# Patient Record
Sex: FEMALE | Race: WHITE | Employment: FULL TIME | URBAN - NONMETROPOLITAN AREA
[De-identification: names, ages, dates, MRNs, and addresses within clinical notes are randomized per-mention and may not be internally consistent; named-entity substitution may affect disease eponyms.]

---

## 2017-01-31 ENCOUNTER — OFFICE VISIT (OUTPATIENT)
Dept: FAMILY MEDICINE CLINIC | Age: 62
End: 2017-01-31

## 2017-01-31 VITALS
DIASTOLIC BLOOD PRESSURE: 68 MMHG | BODY MASS INDEX: 37.77 KG/M2 | HEART RATE: 68 BPM | WEIGHT: 235 LBS | HEIGHT: 66 IN | OXYGEN SATURATION: 95 % | SYSTOLIC BLOOD PRESSURE: 116 MMHG

## 2017-01-31 DIAGNOSIS — I26.09 OTHER ACUTE PULMONARY EMBOLISM WITH ACUTE COR PULMONALE (HCC): Primary | ICD-10-CM

## 2017-01-31 DIAGNOSIS — I82.4Z1 ACUTE EMBOLISM AND THROMBOSIS OF DEEP VEIN OF RIGHT DISTAL LOWER EXTREMITY (HCC): ICD-10-CM

## 2017-01-31 DIAGNOSIS — R06.09 DYSPNEA ON EXERTION: ICD-10-CM

## 2017-01-31 DIAGNOSIS — I82.4Y1 ACUTE DEEP VEIN THROMBOSIS (DVT) OF PROXIMAL VEIN OF RIGHT LOWER EXTREMITY (HCC): ICD-10-CM

## 2017-01-31 PROCEDURE — 99214 OFFICE O/P EST MOD 30 MIN: CPT | Performed by: FAMILY MEDICINE

## 2017-01-31 ASSESSMENT — ENCOUNTER SYMPTOMS
ABDOMINAL PAIN: 0
COUGH: 0
BLURRED VISION: 0

## 2017-01-31 ASSESSMENT — PATIENT HEALTH QUESTIONNAIRE - PHQ9
2. FEELING DOWN, DEPRESSED OR HOPELESS: 0
1. LITTLE INTEREST OR PLEASURE IN DOING THINGS: 0
SUM OF ALL RESPONSES TO PHQ9 QUESTIONS 1 & 2: 0
SUM OF ALL RESPONSES TO PHQ QUESTIONS 1-9: 0

## 2017-04-05 ENCOUNTER — TELEPHONE (OUTPATIENT)
Dept: FAMILY MEDICINE CLINIC | Age: 62
End: 2017-04-05

## 2017-05-10 ENCOUNTER — TELEPHONE (OUTPATIENT)
Dept: FAMILY MEDICINE CLINIC | Age: 62
End: 2017-05-10

## 2017-05-11 ENCOUNTER — OFFICE VISIT (OUTPATIENT)
Dept: FAMILY MEDICINE CLINIC | Age: 62
End: 2017-05-11

## 2017-05-11 VITALS
DIASTOLIC BLOOD PRESSURE: 72 MMHG | OXYGEN SATURATION: 96 % | WEIGHT: 237.4 LBS | BODY MASS INDEX: 38.15 KG/M2 | HEART RATE: 82 BPM | SYSTOLIC BLOOD PRESSURE: 118 MMHG | HEIGHT: 66 IN

## 2017-05-11 DIAGNOSIS — Z86.711 HISTORY OF PULMONARY EMBOLISM: Primary | ICD-10-CM

## 2017-05-11 PROCEDURE — 99213 OFFICE O/P EST LOW 20 MIN: CPT | Performed by: FAMILY MEDICINE

## 2017-05-11 RX ORDER — DABIGATRAN ETEXILATE 150 MG/1
150 CAPSULE, COATED PELLETS ORAL 2 TIMES DAILY
Qty: 60 CAPSULE | Refills: 1 | Status: SHIPPED | OUTPATIENT
Start: 2017-05-11 | End: 2017-05-18 | Stop reason: SDUPTHER

## 2017-05-11 ASSESSMENT — ENCOUNTER SYMPTOMS
ABDOMINAL PAIN: 0
BLURRED VISION: 0
COUGH: 0
SHORTNESS OF BREATH: 1

## 2017-05-18 ENCOUNTER — TELEPHONE (OUTPATIENT)
Dept: FAMILY MEDICINE CLINIC | Age: 62
End: 2017-05-18

## 2017-05-18 DIAGNOSIS — Z86.711 HISTORY OF PULMONARY EMBOLISM: ICD-10-CM

## 2017-05-18 RX ORDER — DABIGATRAN ETEXILATE 150 MG/1
150 CAPSULE, COATED PELLETS ORAL 2 TIMES DAILY
Qty: 60 CAPSULE | Refills: 1 | Status: SHIPPED | OUTPATIENT
Start: 2017-05-18 | End: 2019-06-14

## 2017-07-11 ENCOUNTER — TELEPHONE (OUTPATIENT)
Dept: FAMILY MEDICINE CLINIC | Age: 62
End: 2017-07-11

## 2017-08-01 ENCOUNTER — TELEPHONE (OUTPATIENT)
Dept: FAMILY MEDICINE CLINIC | Age: 62
End: 2017-08-01

## 2019-06-14 ENCOUNTER — OFFICE VISIT (OUTPATIENT)
Dept: FAMILY MEDICINE CLINIC | Age: 64
End: 2019-06-14
Payer: COMMERCIAL

## 2019-06-14 VITALS
HEIGHT: 66 IN | DIASTOLIC BLOOD PRESSURE: 74 MMHG | WEIGHT: 229.6 LBS | HEART RATE: 64 BPM | BODY MASS INDEX: 36.9 KG/M2 | OXYGEN SATURATION: 98 % | RESPIRATION RATE: 12 BRPM | SYSTOLIC BLOOD PRESSURE: 120 MMHG

## 2019-06-14 DIAGNOSIS — R59.0 AXILLARY LYMPHADENOPATHY: ICD-10-CM

## 2019-06-14 DIAGNOSIS — N63.13 MASS OF LOWER OUTER QUADRANT OF RIGHT BREAST: ICD-10-CM

## 2019-06-14 DIAGNOSIS — N63.21 MASS OF UPPER OUTER QUADRANT OF LEFT BREAST: Primary | ICD-10-CM

## 2019-06-14 PROCEDURE — 99213 OFFICE O/P EST LOW 20 MIN: CPT | Performed by: FAMILY MEDICINE

## 2019-06-14 ASSESSMENT — PATIENT HEALTH QUESTIONNAIRE - PHQ9
1. LITTLE INTEREST OR PLEASURE IN DOING THINGS: 0
SUM OF ALL RESPONSES TO PHQ QUESTIONS 1-9: 0
2. FEELING DOWN, DEPRESSED OR HOPELESS: 0
SUM OF ALL RESPONSES TO PHQ9 QUESTIONS 1 & 2: 0
SUM OF ALL RESPONSES TO PHQ QUESTIONS 1-9: 0

## 2019-06-14 NOTE — LETTER
[address]  [address] [address] Dustin Solis  Phone: [phone]  Fax: [phone]    Jorge Obrien MD        June 14, 2019     Patient: Char Coto   YOB: 1955   Date of Visit: 6/14/2019       To Whom It May Concern: It is my medical opinion that Amos Tanner should be excused for work absence for today through Monday 6/17/2019. If you have any questions or concerns, please don't hesitate to call.     Sincerely,        Jorge Obrien MD

## 2019-06-14 NOTE — PROGRESS NOTES
Chief Complaint   Patient presents with    Mass     right breast noticed a month ago. Hoh NARRATIVE:    AS ABOVE. It is somewhat firm and mobile and larger than 1 centimeter. No strong FHx breast cancer. No nipple discharge or skin changes. No previous mammograms. Patient is established unless otherwise noted. Above chief complaint and Hoh obtained by this physician provider. Review of Systems   Constitutional: Negative for activity change, chills, fatigue and fever. HENT: Negative for congestion. Respiratory: Negative for cough, chest tightness, shortness of breath and wheezing. Cardiovascular: Negative for chest pain and leg swelling. Gastrointestinal: Negative for abdominal pain. Musculoskeletal: Negative for back pain and myalgias. Skin: Negative for rash. Psychiatric/Behavioral: Negative for behavioral problems and dysphoric mood. No Known Allergies  Allergy historyupdated. No outpatient medications have been marked as taking for the 6/14/19 encounter (Office Visit) with Odalis Liu MD.       Tobacco use history updated. Social History     Tobacco Use   Smoking Status Former Smoker    Packs/day: 0.50    Last attempt to quit: 6/14/2016    Years since quitting: 3.0   Smokeless Tobacco Never Used        Nursing note reviewed. Vitals:    06/14/19 0802   BP: 120/74   Site: Left Upper Arm   Position: Sitting   Cuff Size: Large Adult   Pulse: 64   Resp: 12   SpO2: 98%   Weight: 229 lb 9.6 oz (104.1 kg)   Height: 5' 6\" (1.676 m)          BP Readings from Last 3 Encounters:   06/14/19 120/74   05/11/17 118/72   01/31/17 116/68     Wt Readings from Last 3 Encounters:   06/14/19 229 lb 9.6 oz (104.1 kg)   05/11/17 237 lb 6.4 oz (107.7 kg)   01/31/17 235 lb (106.6 kg)     Body mass index is 37.06 kg/m². No results found for this visit on 06/14/19. Physical Exam   Constitutional: She is oriented to person, place, and time.  She appears well-developed and well-nourished. No distress. HENT:   Head: Normocephalic. Mouth/Throat: Oropharynx is clear and moist.   Eyes: Pupils are equal, round, and reactive to light. Conjunctivae are normal. Right eye exhibits no discharge. Left eye exhibits no discharge. Neck: Normal range of motion. Cardiovascular: Normal rate, regular rhythm and normal heart sounds. No murmur heard. Pulmonary/Chest: Effort normal and breath sounds normal. No respiratory distress. She has no wheezes. She has no rales. Bilateral breast exam reveals 1.5 cm breast mass in LO quadrant of right breast, and a smaller mass in upper outer quadrant of left breast.  She also has mild NUNU in right axilla. Left breast was normal.     Neurological: She is alert and oriented to person, place, and time. Skin: Skin is warm and dry. She is not diaphoretic. Psychiatric: She has a normal mood and affect. Her behavior is normal.   Nursing note and vitals reviewed. _________________________________________________  Assessment:     [facility] was seen today for mass. Diagnoses and all orders for this visit:    Mass of upper outer quadrant of left breast  -     [facility] DIGITAL DIAGNOSTIC AUGMENTED BILATERAL; Future  -     Cancel: WILMER US BREAST LEFT COMPLETE; Future    Mass of lower outer quadrant of right breast  -     [facility] DIGITAL DIAGNOSTIC AUGMENTED BILATERAL; Future  -     Cancel: WILMER US BREAST RIGHT COMPLETE; Future    Axillary lymphadenopathy  -     [facility] DIGITAL DIAGNOSTIC AUGMENTED BILATERAL; Future  -     Cancel: WILMER US BREAST RIGHT COMPLETE; Future  -     Cancel: WILMER US BREAST LEFT COMPLETE; Future      Problems listed above are all new and alarming:new breast findings as above need mammo and possibly additional imaging. Therapeutic plan is unchanged unless otherwise specified. See orders above and comments below for details of workup or medication orders.           _________________________________________________  Plan:     Referred for mammo. Will likely need to go on to biopsy unless imagine shows benign cyst.       Return if symptoms worsen or fail to improve, for n.       Electronically signed by Efra Domínguez MD on 6/14/19 at 8:11 AM

## 2019-06-17 ENCOUNTER — HOSPITAL ENCOUNTER (OUTPATIENT)
Dept: WOMENS IMAGING | Age: 64
Discharge: HOME | End: 2019-06-17
Payer: COMMERCIAL

## 2019-06-17 ENCOUNTER — HOSPITAL ENCOUNTER (OUTPATIENT)
Dept: ULTRASOUND IMAGING | Age: 64
Discharge: HOME | End: 2019-06-17
Payer: COMMERCIAL

## 2019-06-17 DIAGNOSIS — N63.21 MASS OF UPPER OUTER QUADRANT OF LEFT BREAST: ICD-10-CM

## 2019-06-17 DIAGNOSIS — N63.13 MASS OF LOWER OUTER QUADRANT OF RIGHT BREAST: ICD-10-CM

## 2019-06-17 DIAGNOSIS — R59.0 AXILLARY LYMPHADENOPATHY: ICD-10-CM

## 2019-06-17 PROCEDURE — 76642 ULTRASOUND BREAST LIMITED: CPT

## 2019-06-17 PROCEDURE — G0279 TOMOSYNTHESIS, MAMMO: HCPCS

## 2019-06-24 ENCOUNTER — HOSPITAL ENCOUNTER (OUTPATIENT)
Dept: ULTRASOUND IMAGING | Age: 64
Discharge: HOME | End: 2019-06-24
Payer: COMMERCIAL

## 2019-06-24 ENCOUNTER — HOSPITAL ENCOUNTER (OUTPATIENT)
Dept: WOMENS IMAGING | Age: 64
Discharge: HOME | End: 2019-06-24
Payer: COMMERCIAL

## 2019-06-24 DIAGNOSIS — N63.10 BREAST MASS, RIGHT: ICD-10-CM

## 2019-06-24 DIAGNOSIS — N63.20 LEFT BREAST MASS: ICD-10-CM

## 2019-06-24 PROCEDURE — 77066 DX MAMMO INCL CAD BI: CPT

## 2019-06-24 PROCEDURE — 88341 IMHCHEM/IMCYTCHM EA ADD ANTB: CPT

## 2019-06-24 PROCEDURE — 88305 TISSUE EXAM BY PATHOLOGIST: CPT

## 2019-06-24 PROCEDURE — 88342 IMHCHEM/IMCYTCHM 1ST ANTB: CPT

## 2019-06-24 PROCEDURE — 88360 TUMOR IMMUNOHISTOCHEM/MANUAL: CPT

## 2019-06-24 PROCEDURE — 2709999900 US BREAST BIOPSY W LOC DEVICE 1ST LESION RIGHT

## 2019-06-24 PROCEDURE — 2720000010 US BREAST BIOPSY W LOC DEVICE 1ST LESION LEFT

## 2019-06-30 ASSESSMENT — ENCOUNTER SYMPTOMS
ABDOMINAL PAIN: 0
SHORTNESS OF BREATH: 0
BACK PAIN: 0
WHEEZING: 0
COUGH: 0
CHEST TIGHTNESS: 0

## 2019-07-01 ENCOUNTER — TELEPHONE (OUTPATIENT)
Dept: FAMILY MEDICINE CLINIC | Age: 64
End: 2019-07-01

## 2019-07-01 DIAGNOSIS — Z17.0 CARCINOMA OF RIGHT BREAST IN FEMALE, ESTROGEN RECEPTOR POSITIVE, UNSPECIFIED SITE OF BREAST (HCC): Primary | ICD-10-CM

## 2019-07-01 DIAGNOSIS — D05.12 DUCTAL CARCINOMA IN SITU (DCIS) OF LEFT BREAST: ICD-10-CM

## 2019-07-01 DIAGNOSIS — C50.911 CARCINOMA OF RIGHT BREAST IN FEMALE, ESTROGEN RECEPTOR POSITIVE, UNSPECIFIED SITE OF BREAST (HCC): Primary | ICD-10-CM

## 2019-07-01 NOTE — TELEPHONE ENCOUNTER
Patient has an appt with Dr Kermit Starkey 7-2-2019 @9:45. Patient was notified and will keep the appt.

## 2019-07-01 NOTE — TELEPHONE ENCOUNTER
When I spoke with her last week she stated she wanted a breast surgeon in [place] or [facility], I left her another VM this morning.

## 2019-07-08 NOTE — PROGRESS NOTES
None    Stress: None   Relationships    Social connections:     Talks on phone: None     Gets together: None     Attends Denominational service: None     Active member of club or organization: None     Attends meetings of clubs or organizations: None     Relationship status: None    Intimate partner violence:     Fear of current or ex partner: None     Emotionally abused: None     Physically abused: None     Forced sexual activity: None   Other Topics Concern    None   Social History Narrative    Caffeine  2 soda's daily        Dental exam every 6 mo- last one 8/2014     No current outpatient medications on file. No current facility-administered medications for this visit. No current outpatient medications on file prior to visit. No current facility-administered medications on file prior to visit. No Known Allergies  Review of Systems  A comprehensive review of systems was negative except for: Endocrine: positive for blood clots to the lung       Objective:      /87   Pulse 69   Resp 18   Wt 229 lb 12.8 oz (104.2 kg)   LMP 01/01/2005 (Approximate)   BMI 37.09 kg/m²   General appearance: alert, appears stated age and cooperative  Neck: no adenopathy and supple, symmetrical, trachea midline  Lungs: clear to auscultation bilaterally  Breasts: normal appearance, no masses or tenderness, No nipple retraction or dimpling, No nipple discharge or bleeding, No axillary or supraclavicular adenopathy. There is lump in the 9 o'clock position on the right and the 4 o'clock position on the left. Heart: regular rate and rhythm, S1, S2 normal, no murmur, click, rub or gallop      Assessment:      Patient is diagnosed with DCIS and lobular invaisive cancer. Plan:      Discussed need for futher evaluation. Will proceed with MRI and genetic studies. Sole Johnson.

## 2019-07-09 ENCOUNTER — OFFICE VISIT (OUTPATIENT)
Dept: SURGERY | Age: 64
End: 2019-07-09
Payer: COMMERCIAL

## 2019-07-09 VITALS
HEART RATE: 69 BPM | DIASTOLIC BLOOD PRESSURE: 87 MMHG | BODY MASS INDEX: 37.09 KG/M2 | SYSTOLIC BLOOD PRESSURE: 139 MMHG | RESPIRATION RATE: 18 BRPM | WEIGHT: 229.8 LBS

## 2019-07-09 DIAGNOSIS — C50.911 BILATERAL MALIGNANT NEOPLASM OF BREAST IN FEMALE, ESTROGEN RECEPTOR POSITIVE, UNSPECIFIED SITE OF BREAST (HCC): Primary | ICD-10-CM

## 2019-07-09 DIAGNOSIS — C50.919 INVASIVE LOBULAR CARCINOMA OF BREAST IN FEMALE (HCC): ICD-10-CM

## 2019-07-09 DIAGNOSIS — Z17.0 BILATERAL MALIGNANT NEOPLASM OF BREAST IN FEMALE, ESTROGEN RECEPTOR POSITIVE, UNSPECIFIED SITE OF BREAST (HCC): Primary | ICD-10-CM

## 2019-07-09 DIAGNOSIS — D05.12 DUCTAL CARCINOMA IN SITU (DCIS) OF LEFT BREAST: ICD-10-CM

## 2019-07-09 DIAGNOSIS — C50.912 BILATERAL MALIGNANT NEOPLASM OF BREAST IN FEMALE, ESTROGEN RECEPTOR POSITIVE, UNSPECIFIED SITE OF BREAST (HCC): Primary | ICD-10-CM

## 2019-07-09 PROCEDURE — 99203 OFFICE O/P NEW LOW 30 MIN: CPT | Performed by: SURGERY

## 2019-07-11 ENCOUNTER — HOSPITAL ENCOUNTER (OUTPATIENT)
Dept: MRI IMAGING | Age: 64
Discharge: HOME | End: 2019-07-11
Payer: COMMERCIAL

## 2019-07-11 DIAGNOSIS — C50.911 CARCINOMA OF RIGHT BREAST IN FEMALE, ESTROGEN RECEPTOR POSITIVE, UNSPECIFIED SITE OF BREAST (HCC): ICD-10-CM

## 2019-07-11 DIAGNOSIS — C50.912 BILATERAL MALIGNANT NEOPLASM OF BREAST IN FEMALE, ESTROGEN RECEPTOR POSITIVE, UNSPECIFIED SITE OF BREAST (HCC): ICD-10-CM

## 2019-07-11 DIAGNOSIS — Z17.0 BILATERAL MALIGNANT NEOPLASM OF BREAST IN FEMALE, ESTROGEN RECEPTOR POSITIVE, UNSPECIFIED SITE OF BREAST (HCC): ICD-10-CM

## 2019-07-11 DIAGNOSIS — Z17.0 CARCINOMA OF RIGHT BREAST IN FEMALE, ESTROGEN RECEPTOR POSITIVE, UNSPECIFIED SITE OF BREAST (HCC): ICD-10-CM

## 2019-07-11 DIAGNOSIS — C50.911 BILATERAL MALIGNANT NEOPLASM OF BREAST IN FEMALE, ESTROGEN RECEPTOR POSITIVE, UNSPECIFIED SITE OF BREAST (HCC): ICD-10-CM

## 2019-07-11 DIAGNOSIS — D05.12 DUCTAL CARCINOMA IN SITU (DCIS) OF LEFT BREAST: ICD-10-CM

## 2019-07-11 LAB
GFR AFRICAN AMERICAN: >60 ML/MIN/1.73M2
GFR NON-AFRICAN AMERICAN: 54 ML/MIN/1.73M2
POC CREATININE: 1 MG/DL (ref 0.6–1.1)

## 2019-07-11 PROCEDURE — 77049 MRI BREAST C-+ W/CAD BI: CPT

## 2019-07-11 PROCEDURE — A9577 INJ MULTIHANCE: HCPCS | Performed by: SURGERY

## 2019-07-11 PROCEDURE — 6360000004 HC RX CONTRAST MEDICATION: Performed by: SURGERY

## 2019-07-11 RX ADMIN — GADOBENATE DIMEGLUMINE 11 ML: 529 INJECTION, SOLUTION INTRAVENOUS at 12:59

## 2019-07-16 ENCOUNTER — OFFICE VISIT (OUTPATIENT)
Dept: SURGERY | Age: 64
End: 2019-07-16
Payer: COMMERCIAL

## 2019-07-16 ENCOUNTER — TELEPHONE (OUTPATIENT)
Dept: FAMILY MEDICINE CLINIC | Age: 64
End: 2019-07-16

## 2019-07-16 VITALS — DIASTOLIC BLOOD PRESSURE: 94 MMHG | HEART RATE: 68 BPM | RESPIRATION RATE: 20 BRPM | SYSTOLIC BLOOD PRESSURE: 148 MMHG

## 2019-07-16 DIAGNOSIS — C50.912 BILATERAL MALIGNANT NEOPLASM OF BREAST IN FEMALE, ESTROGEN RECEPTOR POSITIVE, UNSPECIFIED SITE OF BREAST (HCC): Primary | ICD-10-CM

## 2019-07-16 DIAGNOSIS — C50.911 BILATERAL MALIGNANT NEOPLASM OF BREAST IN FEMALE, ESTROGEN RECEPTOR POSITIVE, UNSPECIFIED SITE OF BREAST (HCC): Primary | ICD-10-CM

## 2019-07-16 DIAGNOSIS — Z17.0 BILATERAL MALIGNANT NEOPLASM OF BREAST IN FEMALE, ESTROGEN RECEPTOR POSITIVE, UNSPECIFIED SITE OF BREAST (HCC): Primary | ICD-10-CM

## 2019-07-16 PROCEDURE — 99212 OFFICE O/P EST SF 10 MIN: CPT | Performed by: SURGERY

## 2019-07-17 ENCOUNTER — ANESTHESIA EVENT (OUTPATIENT)
Dept: OPERATING ROOM | Age: 64
End: 2019-07-17
Payer: COMMERCIAL

## 2019-07-17 NOTE — ANESTHESIA PRE PROCEDURE
hypertension and tricuspid valve regurgitation. CARDIOVASCULAR:  Normal apical impulse, regular rate and rhythm, normal S1 and S2, no S3 or S4, and no murmur noted    CP or SOB: No  Exercise: NO    EK2019  SB, HR 53     Neuro/Psych:               GI/Hepatic/Renal:   (+) renal disease (elevated CR: 1.2, 2019. Previously 1.0 , 2017. ):,          ROS comment: Hx dysphagia s/p dilatation, . Endo/Other:    (+) malignancy/cancer (ana breast CA. DCIS on the left and infiltrating lobular on the right). Pt had PAT visit. Abdominal:           Vascular:   + DVT (right leg ), PE (Saddle embolus of pulmonary artery without acute cor pulmonale, 2016. Was treated several month. Care Everywhere notes: was encourage to remain on life long treatment but refused. ). Anesthesia Plan      general     ASA 2       Induction: intravenous. MIPS: Postoperative opioids intended and Prophylactic antiemetics administered. Anesthetic plan and risks discussed with patient and spouse. Plan discussed with CRNA. CHUCK Ferguson - CNP Anesthesia Evaluation will follow by a certified practitioner prior to surgery.   2019

## 2019-07-19 ENCOUNTER — HOSPITAL ENCOUNTER (OUTPATIENT)
Dept: PREADMISSION TESTING | Age: 64
Discharge: HOME | End: 2019-07-23
Payer: COMMERCIAL

## 2019-07-19 VITALS
WEIGHT: 231 LBS | DIASTOLIC BLOOD PRESSURE: 82 MMHG | RESPIRATION RATE: 18 BRPM | BODY MASS INDEX: 37.12 KG/M2 | OXYGEN SATURATION: 97 % | HEART RATE: 64 BPM | HEIGHT: 66 IN | SYSTOLIC BLOOD PRESSURE: 147 MMHG | TEMPERATURE: 97.6 F

## 2019-07-19 LAB
ANION GAP SERPL CALCULATED.3IONS-SCNC: 10 MMOL/L (ref 4–16)
BUN BLDV-MCNC: 14 MG/DL (ref 6–23)
CALCIUM SERPL-MCNC: 9.2 MG/DL (ref 8.3–10.6)
CHLORIDE BLD-SCNC: 104 MMOL/L (ref 99–110)
CO2: 27 MMOL/L (ref 21–32)
CREAT SERPL-MCNC: 1.2 MG/DL (ref 0.6–1.1)
EKG ATRIAL RATE: 53 BPM
EKG DIAGNOSIS: NORMAL
EKG P AXIS: 37 DEGREES
EKG P-R INTERVAL: 118 MS
EKG Q-T INTERVAL: 454 MS
EKG QRS DURATION: 84 MS
EKG QTC CALCULATION (BAZETT): 426 MS
EKG R AXIS: 18 DEGREES
EKG T AXIS: 18 DEGREES
EKG VENTRICULAR RATE: 53 BPM
GFR AFRICAN AMERICAN: 55 ML/MIN/1.73M2
GFR NON-AFRICAN AMERICAN: 45 ML/MIN/1.73M2
GLUCOSE BLD-MCNC: 103 MG/DL (ref 70–99)
HCT VFR BLD CALC: 43.8 % (ref 37–47)
HEMOGLOBIN: 13.9 GM/DL (ref 12.5–16)
MCH RBC QN AUTO: 29.7 PG (ref 27–31)
MCHC RBC AUTO-ENTMCNC: 31.7 % (ref 32–36)
MCV RBC AUTO: 93.6 FL (ref 78–100)
PDW BLD-RTO: 13.2 % (ref 11.7–14.9)
PLATELET # BLD: 287 K/CU MM (ref 140–440)
PMV BLD AUTO: 10.8 FL (ref 7.5–11.1)
POTASSIUM SERPL-SCNC: 4.3 MMOL/L (ref 3.5–5.1)
RBC # BLD: 4.68 M/CU MM (ref 4.2–5.4)
SODIUM BLD-SCNC: 141 MMOL/L (ref 135–145)
WBC # BLD: 7.2 K/CU MM (ref 4–10.5)

## 2019-07-19 PROCEDURE — 93005 ELECTROCARDIOGRAM TRACING: CPT | Performed by: NURSE PRACTITIONER

## 2019-07-19 PROCEDURE — 36415 COLL VENOUS BLD VENIPUNCTURE: CPT

## 2019-07-19 PROCEDURE — 80048 BASIC METABOLIC PNL TOTAL CA: CPT

## 2019-07-19 PROCEDURE — 85027 COMPLETE CBC AUTOMATED: CPT

## 2019-07-19 PROCEDURE — 93010 ELECTROCARDIOGRAM REPORT: CPT | Performed by: INTERNAL MEDICINE

## 2019-07-19 RX ORDER — CEFAZOLIN SODIUM 2 G/100ML
2 INJECTION, SOLUTION INTRAVENOUS ONCE
Status: CANCELLED | OUTPATIENT
Start: 2019-07-23

## 2019-07-24 ENCOUNTER — HOSPITAL ENCOUNTER (OUTPATIENT)
Dept: NUCLEAR MEDICINE | Age: 64
Discharge: HOME | End: 2019-07-24
Payer: COMMERCIAL

## 2019-07-24 ENCOUNTER — HOSPITAL ENCOUNTER (OUTPATIENT)
Age: 64
Setting detail: OUTPATIENT SURGERY
Discharge: HOME | End: 2019-07-24
Attending: SURGERY | Admitting: SURGERY
Payer: COMMERCIAL

## 2019-07-24 ENCOUNTER — ANESTHESIA (OUTPATIENT)
Dept: OPERATING ROOM | Age: 64
End: 2019-07-24
Payer: COMMERCIAL

## 2019-07-24 ENCOUNTER — HOSPITAL ENCOUNTER (OUTPATIENT)
Dept: ULTRASOUND IMAGING | Age: 64
Discharge: HOME | End: 2019-07-24
Attending: SURGERY
Payer: COMMERCIAL

## 2019-07-24 ENCOUNTER — HOSPITAL ENCOUNTER (OUTPATIENT)
Dept: MAMMOGRAPHY | Age: 64
Setting detail: OUTPATIENT SURGERY
Discharge: HOME | End: 2019-07-24
Attending: SURGERY
Payer: COMMERCIAL

## 2019-07-24 VITALS
TEMPERATURE: 98 F | OXYGEN SATURATION: 93 % | RESPIRATION RATE: 16 BRPM | HEART RATE: 70 BPM | DIASTOLIC BLOOD PRESSURE: 82 MMHG | SYSTOLIC BLOOD PRESSURE: 130 MMHG

## 2019-07-24 VITALS
RESPIRATION RATE: 10 BRPM | OXYGEN SATURATION: 100 % | SYSTOLIC BLOOD PRESSURE: 163 MMHG | DIASTOLIC BLOOD PRESSURE: 89 MMHG

## 2019-07-24 DIAGNOSIS — C50.912 MALIGNANT NEOPLASM OF LEFT FEMALE BREAST, UNSPECIFIED ESTROGEN RECEPTOR STATUS, UNSPECIFIED SITE OF BREAST (HCC): ICD-10-CM

## 2019-07-24 DIAGNOSIS — C50.912 BILATERAL MALIGNANT NEOPLASM OF BREAST IN FEMALE, ESTROGEN RECEPTOR POSITIVE, UNSPECIFIED SITE OF BREAST (HCC): Primary | ICD-10-CM

## 2019-07-24 DIAGNOSIS — Z17.0 BILATERAL MALIGNANT NEOPLASM OF BREAST IN FEMALE, ESTROGEN RECEPTOR POSITIVE, UNSPECIFIED SITE OF BREAST (HCC): Primary | ICD-10-CM

## 2019-07-24 DIAGNOSIS — C50.911 BILATERAL MALIGNANT NEOPLASM OF BREAST IN FEMALE, ESTROGEN RECEPTOR POSITIVE, UNSPECIFIED SITE OF BREAST (HCC): Primary | ICD-10-CM

## 2019-07-24 PROCEDURE — 6360000002 HC RX W HCPCS: Performed by: NURSE ANESTHETIST, CERTIFIED REGISTERED

## 2019-07-24 PROCEDURE — 19285 PERQ DEV BREAST 1ST US IMAG: CPT

## 2019-07-24 PROCEDURE — A9541 TC99M SULFUR COLLOID: HCPCS | Performed by: SURGERY

## 2019-07-24 PROCEDURE — 19125 EXCISION BREAST LESION: CPT | Performed by: SURGERY

## 2019-07-24 PROCEDURE — 3700000000 HC ANESTHESIA ATTENDED CARE: Performed by: SURGERY

## 2019-07-24 PROCEDURE — 76098 X-RAY EXAM SURGICAL SPECIMEN: CPT

## 2019-07-24 PROCEDURE — 2580000003 HC RX 258

## 2019-07-24 PROCEDURE — 2709999900 HC NON-CHARGEABLE SUPPLY: Performed by: SURGERY

## 2019-07-24 PROCEDURE — 7100000001 HC PACU RECOVERY - ADDTL 15 MIN: Performed by: SURGERY

## 2019-07-24 PROCEDURE — 38900 IO MAP OF SENT LYMPH NODE: CPT | Performed by: SURGERY

## 2019-07-24 PROCEDURE — 38525 BIOPSY/REMOVAL LYMPH NODES: CPT | Performed by: SURGERY

## 2019-07-24 PROCEDURE — 6360000002 HC RX W HCPCS: Performed by: ANESTHESIOLOGY

## 2019-07-24 PROCEDURE — 6360000002 HC RX W HCPCS: Performed by: SURGERY

## 2019-07-24 PROCEDURE — 7100000000 HC PACU RECOVERY - FIRST 15 MIN: Performed by: SURGERY

## 2019-07-24 PROCEDURE — 3700000001 HC ADD 15 MINUTES (ANESTHESIA): Performed by: SURGERY

## 2019-07-24 PROCEDURE — 3430000000 HC RX DIAGNOSTIC RADIOPHARMACEUTICAL: Performed by: SURGERY

## 2019-07-24 PROCEDURE — 3600000013 HC SURGERY LEVEL 3 ADDTL 15MIN: Performed by: SURGERY

## 2019-07-24 PROCEDURE — 7100000011 HC PHASE II RECOVERY - ADDTL 15 MIN: Performed by: SURGERY

## 2019-07-24 PROCEDURE — 38792 RA TRACER ID OF SENTINL NODE: CPT

## 2019-07-24 PROCEDURE — 19301 PARTIAL MASTECTOMY: CPT | Performed by: SURGERY

## 2019-07-24 PROCEDURE — 19294 PREPJ TUM CAV IORT PRTL MAST: CPT | Performed by: SURGERY

## 2019-07-24 PROCEDURE — A4648 IMPLANTABLE TISSUE MARKER: HCPCS | Performed by: SURGERY

## 2019-07-24 PROCEDURE — 88307 TISSUE EXAM BY PATHOLOGIST: CPT

## 2019-07-24 PROCEDURE — 6370000000 HC RX 637 (ALT 250 FOR IP): Performed by: SURGERY

## 2019-07-24 PROCEDURE — 3600000003 HC SURGERY LEVEL 3 BASE: Performed by: SURGERY

## 2019-07-24 PROCEDURE — 77065 DX MAMMO INCL CAD UNI: CPT

## 2019-07-24 PROCEDURE — 7100000010 HC PHASE II RECOVERY - FIRST 15 MIN: Performed by: SURGERY

## 2019-07-24 DEVICE — MARKER TISS W3XL4CM RADIOGRAPHIC BIOABSRB SPCR HLD RADPQ: Type: IMPLANTABLE DEVICE | Status: FUNCTIONAL

## 2019-07-24 DEVICE — THE MARKER IS A RADIOGRAPHIC IMPLANTABLE MARKER USED TO MARK SOFT TISSUE.IT IS COMPRISED OF A BIOABSORBABLE SPACER THAT HOLDS RADIOPAQUE MARKER CLIPS.
Type: IMPLANTABLE DEVICE | Status: FUNCTIONAL
Brand: BIOZORB MARKER

## 2019-07-24 RX ORDER — PROMETHAZINE HYDROCHLORIDE 25 MG/ML
6.25 INJECTION, SOLUTION INTRAMUSCULAR; INTRAVENOUS
Status: DISCONTINUED | OUTPATIENT
Start: 2019-07-24 | End: 2019-07-24 | Stop reason: HOSPADM

## 2019-07-24 RX ORDER — HYDROMORPHONE HCL 110MG/55ML
0.25 PATIENT CONTROLLED ANALGESIA SYRINGE INTRAVENOUS EVERY 5 MIN PRN
Status: DISCONTINUED | OUTPATIENT
Start: 2019-07-24 | End: 2019-07-24 | Stop reason: HOSPADM

## 2019-07-24 RX ORDER — SODIUM CHLORIDE, SODIUM LACTATE, POTASSIUM CHLORIDE, CALCIUM CHLORIDE 600; 310; 30; 20 MG/100ML; MG/100ML; MG/100ML; MG/100ML
INJECTION, SOLUTION INTRAVENOUS CONTINUOUS
Status: DISCONTINUED | OUTPATIENT
Start: 2019-07-24 | End: 2019-07-24 | Stop reason: HOSPADM

## 2019-07-24 RX ORDER — CEFAZOLIN SODIUM 2 G/100ML
2 INJECTION, SOLUTION INTRAVENOUS ONCE
Status: COMPLETED | OUTPATIENT
Start: 2019-07-24 | End: 2019-07-24

## 2019-07-24 RX ORDER — FENTANYL CITRATE 50 UG/ML
25 INJECTION, SOLUTION INTRAMUSCULAR; INTRAVENOUS EVERY 5 MIN PRN
Status: DISCONTINUED | OUTPATIENT
Start: 2019-07-24 | End: 2019-07-24 | Stop reason: HOSPADM

## 2019-07-24 RX ORDER — HYDROCODONE BITARTRATE AND ACETAMINOPHEN 5; 325 MG/1; MG/1
1 TABLET ORAL EVERY 4 HOURS PRN
Qty: 42 TABLET | Refills: 0 | Status: SHIPPED | OUTPATIENT
Start: 2019-07-24 | End: 2019-07-31

## 2019-07-24 RX ORDER — LABETALOL 20 MG/4 ML (5 MG/ML) INTRAVENOUS SYRINGE
5 EVERY 10 MIN PRN
Status: DISCONTINUED | OUTPATIENT
Start: 2019-07-24 | End: 2019-07-24 | Stop reason: HOSPADM

## 2019-07-24 RX ORDER — ONDANSETRON 2 MG/ML
INJECTION INTRAMUSCULAR; INTRAVENOUS PRN
Status: DISCONTINUED | OUTPATIENT
Start: 2019-07-24 | End: 2019-07-24 | Stop reason: SDUPTHER

## 2019-07-24 RX ORDER — PROPOFOL 10 MG/ML
INJECTION, EMULSION INTRAVENOUS PRN
Status: DISCONTINUED | OUTPATIENT
Start: 2019-07-24 | End: 2019-07-24 | Stop reason: SDUPTHER

## 2019-07-24 RX ORDER — DEXAMETHASONE SODIUM PHOSPHATE 4 MG/ML
INJECTION, SOLUTION INTRA-ARTICULAR; INTRALESIONAL; INTRAMUSCULAR; INTRAVENOUS; SOFT TISSUE PRN
Status: DISCONTINUED | OUTPATIENT
Start: 2019-07-24 | End: 2019-07-24 | Stop reason: SDUPTHER

## 2019-07-24 RX ORDER — SODIUM CHLORIDE, SODIUM LACTATE, POTASSIUM CHLORIDE, CALCIUM CHLORIDE 600; 310; 30; 20 MG/100ML; MG/100ML; MG/100ML; MG/100ML
INJECTION, SOLUTION INTRAVENOUS CONTINUOUS PRN
Status: DISCONTINUED | OUTPATIENT
Start: 2019-07-24 | End: 2019-07-24

## 2019-07-24 RX ORDER — HYDROMORPHONE HCL 110MG/55ML
0.5 PATIENT CONTROLLED ANALGESIA SYRINGE INTRAVENOUS EVERY 5 MIN PRN
Status: DISCONTINUED | OUTPATIENT
Start: 2019-07-24 | End: 2019-07-24 | Stop reason: HOSPADM

## 2019-07-24 RX ORDER — HYDRALAZINE HYDROCHLORIDE 20 MG/ML
5 INJECTION INTRAMUSCULAR; INTRAVENOUS EVERY 10 MIN PRN
Status: DISCONTINUED | OUTPATIENT
Start: 2019-07-24 | End: 2019-07-24 | Stop reason: HOSPADM

## 2019-07-24 RX ORDER — FENTANYL CITRATE 50 UG/ML
INJECTION, SOLUTION INTRAMUSCULAR; INTRAVENOUS PRN
Status: DISCONTINUED | OUTPATIENT
Start: 2019-07-24 | End: 2019-07-24 | Stop reason: SDUPTHER

## 2019-07-24 RX ORDER — MORPHINE SULFATE 2 MG/ML
2 INJECTION, SOLUTION INTRAMUSCULAR; INTRAVENOUS EVERY 5 MIN PRN
Status: DISCONTINUED | OUTPATIENT
Start: 2019-07-24 | End: 2019-07-24 | Stop reason: HOSPADM

## 2019-07-24 RX ORDER — CEFAZOLIN SODIUM 2 G/100ML
INJECTION, SOLUTION INTRAVENOUS
Status: COMPLETED
Start: 2019-07-24 | End: 2019-07-24

## 2019-07-24 RX ORDER — SODIUM CHLORIDE, SODIUM LACTATE, POTASSIUM CHLORIDE, CALCIUM CHLORIDE 600; 310; 30; 20 MG/100ML; MG/100ML; MG/100ML; MG/100ML
INJECTION, SOLUTION INTRAVENOUS
Status: COMPLETED
Start: 2019-07-24 | End: 2019-07-24

## 2019-07-24 RX ORDER — ACETAMINOPHEN 10 MG/ML
1000 INJECTION, SOLUTION INTRAVENOUS ONCE
Status: COMPLETED | OUTPATIENT
Start: 2019-07-24 | End: 2019-07-24

## 2019-07-24 RX ORDER — HYDROCODONE BITARTRATE AND ACETAMINOPHEN 5; 325 MG/1; MG/1
1 TABLET ORAL ONCE
Status: COMPLETED | OUTPATIENT
Start: 2019-07-24 | End: 2019-07-24

## 2019-07-24 RX ADMIN — Medication 1.5 MILLICURIE: at 09:15

## 2019-07-24 RX ADMIN — PROPOFOL 200 MG: 10 INJECTION, EMULSION INTRAVENOUS at 11:49

## 2019-07-24 RX ADMIN — SODIUM CHLORIDE, SODIUM LACTATE, POTASSIUM CHLORIDE, CALCIUM CHLORIDE: 600; 310; 30; 20 INJECTION, SOLUTION INTRAVENOUS at 10:27

## 2019-07-24 RX ADMIN — CEFAZOLIN SODIUM 2 G: 2 INJECTION, SOLUTION INTRAVENOUS at 11:56

## 2019-07-24 RX ADMIN — FENTANYL CITRATE 25 MCG: 50 INJECTION INTRAMUSCULAR; INTRAVENOUS at 14:22

## 2019-07-24 RX ADMIN — DEXAMETHASONE SODIUM PHOSPHATE 8 MG: 4 INJECTION, SOLUTION INTRAMUSCULAR; INTRAVENOUS at 11:56

## 2019-07-24 RX ADMIN — HYDROCODONE BITARTRATE AND ACETAMINOPHEN 1 TABLET: 5; 325 TABLET ORAL at 16:04

## 2019-07-24 RX ADMIN — FENTANYL CITRATE 25 MCG: 50 INJECTION INTRAMUSCULAR; INTRAVENOUS at 12:13

## 2019-07-24 RX ADMIN — FENTANYL CITRATE 50 MCG: 50 INJECTION INTRAMUSCULAR; INTRAVENOUS at 11:57

## 2019-07-24 RX ADMIN — FENTANYL CITRATE 25 MCG: 50 INJECTION INTRAMUSCULAR; INTRAVENOUS at 14:15

## 2019-07-24 RX ADMIN — ONDANSETRON 4 MG: 2 INJECTION INTRAMUSCULAR; INTRAVENOUS at 11:56

## 2019-07-24 RX ADMIN — FENTANYL CITRATE 25 MCG: 50 INJECTION INTRAMUSCULAR; INTRAVENOUS at 12:09

## 2019-07-24 RX ADMIN — SODIUM CHLORIDE, POTASSIUM CHLORIDE, SODIUM LACTATE AND CALCIUM CHLORIDE: 600; 310; 30; 20 INJECTION, SOLUTION INTRAVENOUS at 10:27

## 2019-07-24 RX ADMIN — ACETAMINOPHEN 1000 MG: 10 INJECTION, SOLUTION INTRAVENOUS at 14:41

## 2019-07-24 ASSESSMENT — PULMONARY FUNCTION TESTS
PIF_VALUE: 11
PIF_VALUE: 9
PIF_VALUE: 6
PIF_VALUE: 10
PIF_VALUE: 11
PIF_VALUE: 10
PIF_VALUE: 11
PIF_VALUE: 10
PIF_VALUE: 11
PIF_VALUE: 10
PIF_VALUE: 4
PIF_VALUE: 10
PIF_VALUE: 16
PIF_VALUE: 10
PIF_VALUE: 11
PIF_VALUE: 10
PIF_VALUE: 11
PIF_VALUE: 10
PIF_VALUE: 11
PIF_VALUE: 10
PIF_VALUE: 11
PIF_VALUE: 3
PIF_VALUE: 2
PIF_VALUE: 11
PIF_VALUE: 10
PIF_VALUE: 1
PIF_VALUE: 11
PIF_VALUE: 11
PIF_VALUE: 10
PIF_VALUE: 10
PIF_VALUE: 11
PIF_VALUE: 10
PIF_VALUE: 1
PIF_VALUE: 11
PIF_VALUE: 10
PIF_VALUE: 2
PIF_VALUE: 11
PIF_VALUE: 6
PIF_VALUE: 11
PIF_VALUE: 10
PIF_VALUE: 11
PIF_VALUE: 10
PIF_VALUE: 21
PIF_VALUE: 10
PIF_VALUE: 27
PIF_VALUE: 27
PIF_VALUE: 10
PIF_VALUE: 11
PIF_VALUE: 10
PIF_VALUE: 10
PIF_VALUE: 11
PIF_VALUE: 10
PIF_VALUE: 11
PIF_VALUE: 10
PIF_VALUE: 11
PIF_VALUE: 10
PIF_VALUE: 2
PIF_VALUE: 11
PIF_VALUE: 11
PIF_VALUE: 17
PIF_VALUE: 11
PIF_VALUE: 11
PIF_VALUE: 10
PIF_VALUE: 8
PIF_VALUE: 3
PIF_VALUE: 11
PIF_VALUE: 10
PIF_VALUE: 11
PIF_VALUE: 1
PIF_VALUE: 21
PIF_VALUE: 10
PIF_VALUE: 11
PIF_VALUE: 15
PIF_VALUE: 11
PIF_VALUE: 10

## 2019-07-24 ASSESSMENT — PAIN - FUNCTIONAL ASSESSMENT: PAIN_FUNCTIONAL_ASSESSMENT: 0-10

## 2019-07-24 ASSESSMENT — PAIN DESCRIPTION - LOCATION
LOCATION: BREAST

## 2019-07-24 ASSESSMENT — PAIN DESCRIPTION - PAIN TYPE
TYPE: SURGICAL PAIN

## 2019-07-24 ASSESSMENT — PAIN SCALES - GENERAL
PAINLEVEL_OUTOF10: 6
PAINLEVEL_OUTOF10: 3
PAINLEVEL_OUTOF10: 5

## 2019-07-24 ASSESSMENT — PAIN DESCRIPTION - ORIENTATION
ORIENTATION: RIGHT;LEFT
ORIENTATION: RIGHT

## 2019-07-24 ASSESSMENT — PAIN DESCRIPTION - DESCRIPTORS
DESCRIPTORS: BURNING;DISCOMFORT
DESCRIPTORS: BURNING;DISCOMFORT

## 2019-07-24 NOTE — PROGRESS NOTES
States pain level now 3-4 on 0-10 scale. instructd on MEG care w/understanding voiced.  Getting dressed

## 2019-07-24 NOTE — PROGRESS NOTES
Bilateral arm measurements taken prior to left-sided lumpectomy and right-sided lumpectomy/sentinel node bxs:     Left arm/hand:  Web Space A - 13.5 cm  Web Space B - 19.5 cm  Styloid C - 16 cm  @ 10 cm - 22.5 cm  @ 20 cm - 26.5 cm  @ 30 cm - 30.5 cm  @ 40 cm - 35 cm    Right arm/hand:  Web Space A - 14.5 cm  Web Space B - 18.5 cm  Styloid C - 16 cm  @ 10 cm - 23.5 cm  @ 20 cm - 27.5 cm  @ 30 cm - 31 cm  @ 40 cm - 33 cm    Electronically signed by Matty Perez RN on 7/24/2019 at 2:31 PM

## 2019-07-24 NOTE — PROGRESS NOTES
1350 patient received from the OR monitor placed and alarms on. Report received from [address] Saida Marte updated that patient has received fentanyl see MAR noted to fall asleep and oxygen saturation was 88% on room air patient encouraged to take deep breathes. Patient states she still has pain educated patient on IV pain medication. She voiced understanding. Order received see MAR.   1592 IV tylenol infusing patient noted to be resting quielty with her eyes closed no signs of distress    1500 Dr Saida Marte updated patient states she has a sore throat stated if it gets worse in same day to update him. Patient tolerating ice chips. states she hasn't had very many surgeries. 1513 transferred back to same day surgery via cart. Report given to [address] [place] [address] Rg RN.

## 2019-07-24 NOTE — H&P
negative    PHYSICAL EXAM:    VITALS:  LMP 01/01/2005 (Approximate)   CONSTITUTIONAL:  awake, alert, no apparent distress and normal weight  ENT:  normocepalic, without obvious abnormality  NECK:  supple, symmetrical, trachea midline and no carotid bruits  LUNGS:  clear to auscultation  CARDIOVASCULAR:  regular rate and rhythm   BREASTS: There is a palpable mass in the lateral right breast that is about 3 cm in size, firn, not very mobile. There are no skin changes, nipple drainage or axillary adenopathy. The left breast has no palpable masses. GASTROINTESTINAL;   normal bowel sounds, soft, non-distended, non-tender, voluntary guarding absent, no masses palpated and hernia absent  MUSCULOSKELETAL:  0+ pitting edema lower extremities  NEUROLOGIC:  Mental Status Exam:  Level of Alertness:   awake  Orientation:   person, place, time      DATA:  CBC:   Lab Results   Component Value Date    WBC 7.2 07/19/2019    RBC 4.68 07/19/2019    HGB 13.9 07/19/2019    HCT 43.8 07/19/2019    MCV 93.6 07/19/2019    MCH 29.7 07/19/2019    MCHC 31.7 07/19/2019    RDW 13.2 07/19/2019     07/19/2019    MPV 10.8 07/19/2019     CMP:    Lab Results   Component Value Date     07/19/2019    K 4.3 07/19/2019     07/19/2019    CO2 27 07/19/2019    BUN 14 07/19/2019    CREATININE 1.2 07/19/2019    GFRAA 55 07/19/2019    AGRATIO 1.3 04/19/2017    LABGLOM 45 07/19/2019    GLUCOSE 103 07/19/2019    LABALBU 4.3 04/19/2017    CALCIUM 9.2 07/19/2019    BILITOT 0.7 04/19/2017    ALKPHOS 75 04/19/2017    AST 17 04/19/2017    ALT 11 04/19/2017       ASSESSMENT AND PLAN:Bilateral breast cancer. Will proceed with right lumpectomy, sentinel node and Biosorb insertion. Will do left lumpectomy with needle localization, and Biosorb insertion on the left. The risks, benefits and alternatives to the planned procedure were discussed. Patient expressed an understanding and is willing to proceed.       Jack Macias

## 2019-07-24 NOTE — OP NOTE
oblique incision was created below the axillary hairline. Dissection was carried through the clavipectoral fascia. No sentinel nodes were identified by the gamma probe even after the incision was opened. Therefore an axillary dissection was done. A #10 flat MEG was placed and brought out below the incision. It was secured with a 2-0 silk suture. The axillary incision was closed with a 3-0 and 5-0 Vicryl subcuticular closure in layers. The right lumpectomy was performed by creating an oblique incision over the nipple areolar complex at the 6-9 o'clock position of the breast. Dissection was carried down to the pectoral fascia. The margins were inked. Hemostasis was achieved with cautery. Placement of the Biosorb marker as a fiduciary marker for radiation therapy was placed and secured with 3-0 PDS sutures. The wound was closed with a 3-0 and 5-0 Vicryl subcuticular closure in layers. Sterile dressings were applied. At the end of the operation, all sponge, instrument, and needle counts were correct. Findings:  grossly clear surgical margins, no adenopathy    Estimated Blood Loss:  less than 50 ml           Drains: #10 flat MEG           Total IV Fluids: 1000 ml           Specimens: BilateraL LUMPECTOMY, RIGHT AXILLARY NODES           Implants: Bilateral Biosorb           Complications:  None; patient tolerated the procedure well. Disposition: PACU - hemodynamically stable. Condition: stable    Postoperatively the patient did well and is allowed to go home. They are to follow up per their appointment. They can remove dressings in 48 hours and shower. They are to do no lifting or driving until seen in the office. They can walk, climb stairs and ride in a car. If they have any problems, they are to give us a call. Benjie Johnson.

## 2019-08-01 ENCOUNTER — OFFICE VISIT (OUTPATIENT)
Dept: SURGERY | Age: 64
End: 2019-08-01

## 2019-08-01 VITALS — RESPIRATION RATE: 20 BRPM | HEART RATE: 67 BPM | SYSTOLIC BLOOD PRESSURE: 145 MMHG | DIASTOLIC BLOOD PRESSURE: 98 MMHG

## 2019-08-01 DIAGNOSIS — C50.919 INVASIVE LOBULAR CARCINOMA OF BREAST IN FEMALE (HCC): ICD-10-CM

## 2019-08-01 DIAGNOSIS — Z09 POSTOP CHECK: ICD-10-CM

## 2019-08-01 DIAGNOSIS — D05.12 DUCTAL CARCINOMA IN SITU (DCIS) OF LEFT BREAST: Primary | ICD-10-CM

## 2019-08-01 PROCEDURE — 99024 POSTOP FOLLOW-UP VISIT: CPT | Performed by: SURGERY

## 2019-08-01 NOTE — PROGRESS NOTES
Gia Urena is 1 week post-op: DCIS left breast with positive margins and lobular carcinoma right breast with positive margins and negative lymph nodes. Presenting for routine follow-up. S:  Doing well. Patient has noted no excessive redness and swelling. Minimal drainage from right LP    O:  Wound healing well. Drainage as expected. A:  Satisfactory course. P: Drains removed. and Subcuticular closure intact. Patient to return in 2 weeks postop after re-excision. Patient is to return as needed for redness, swelling, discomfort, or any concern about her  surgery.

## 2019-08-04 ENCOUNTER — ANESTHESIA EVENT (OUTPATIENT)
Dept: OPERATING ROOM | Age: 64
End: 2019-08-04
Payer: COMMERCIAL

## 2019-08-04 NOTE — ANESTHESIA PRE PROCEDURE
Department of Anesthesiology  Preprocedure Note       Name:  Danna Epstein   Age:  61 y.o.  :  1955                                          MRN:  [id number]         Date:  2019      Surgeon: Brock Romano):  Sherice Clemens MD    Procedure: BILATERAL BREAST LESION  EXCISION OF LUMPECTOMY (Bilateral )    Medications prior to admission:   Prior to Admission medications    Medication Sig Start Date End Date Taking? Authorizing Provider   apixaban (ELIQUIS) 5 MG TABS tablet Take 2 tablets by mouth daily Omit the \"Take 2 tablets by mouth daily\". 19  Sherice Clemens MD       Current medications:    No current facility-administered medications for this encounter. Current Outpatient Medications   Medication Sig Dispense Refill    apixaban (ELIQUIS) 5 MG TABS tablet Take 2 tablets by mouth daily Omit the \"Take 2 tablets by mouth daily\".  60 tablet 0       Allergies:  No Known Allergies    Problem List:    Patient Active Problem List   Diagnosis Code    Otitis externa of left ear H60.92    Family history of diabetes mellitus Z83.3    Family history of heart disease Z82.49    Acute embolism and thrombosis of deep vein of right distal lower extremity (HCC) I82.4Z1    Pulmonary embolism (HCC) I26.99    Acute deep vein thrombosis (DVT) of proximal vein of right lower extremity (HCC) I82.4Y1    Bilateral malignant neoplasm of breast in female, estrogen receptor positive ([address] C50.911, Z17.0, C50.912       Past Medical History:        Diagnosis Date    Breast cancer ([address] 2019    DCIS on the left and infiltrating lobular on the right    Bronchitis 2019    DVT (deep vein thrombosis) in pregnancy ([address] 2016    right leg    Pulmonary embolism ([address] 2016    saddle       Past Surgical History:        Procedure Laterality Date    BREAST LUMPECTOMY Bilateral 2019    BREAST LUMPECTOMY PARTIAL MASTECTOMY RIGHT SENITINEL NODE BX LEFT LUMPECTOMY NEEDLE LOC BILATERAL PREGSERUM, HCG, HCGQUANT     ABGs: No results found for: PHART, PO2ART, IRQ0QMI, VOI6FIN, BEART, W2UDCPOK     Type & Screen (If Applicable):  No results found for: LABABO, [address]    Anesthesia Evaluation  Patient summary reviewed  Airway:         Dental:          Pulmonary:                             ROS comment: Saddle pulmonary embolism    H/O bronchitis   Cardiovascular:                      Neuro/Psych:               GI/Hepatic/Renal:   (+) morbid obesity          Endo/Other:    (+) malignancy/cancer. ROS comment: Breast CA: DCIS on the left and infiltrating lobular on the right Abdominal:           Vascular:   + DVT, PE.        ROS comment: DVT right leg with pregnancy. Anesthesia Plan      general     ASA 3       Induction: intravenous. MIPS: Postoperative opioids intended and Prophylactic antiemetics administered. Pre Anesthesia Assessment complete.  Chart reviewed on 8/4/2019      CHUCK Lee - CRNA   8/4/2019

## 2019-08-05 ENCOUNTER — HOSPITAL ENCOUNTER (OUTPATIENT)
Age: 64
Setting detail: OUTPATIENT SURGERY
Discharge: HOME | End: 2019-08-05
Attending: SURGERY | Admitting: SURGERY
Payer: COMMERCIAL

## 2019-08-05 ENCOUNTER — ANESTHESIA (OUTPATIENT)
Dept: OPERATING ROOM | Age: 64
End: 2019-08-05
Payer: COMMERCIAL

## 2019-08-05 VITALS
HEART RATE: 63 BPM | DIASTOLIC BLOOD PRESSURE: 72 MMHG | HEIGHT: 66 IN | TEMPERATURE: 97 F | SYSTOLIC BLOOD PRESSURE: 127 MMHG | OXYGEN SATURATION: 95 % | WEIGHT: 229 LBS | BODY MASS INDEX: 36.8 KG/M2 | RESPIRATION RATE: 16 BRPM

## 2019-08-05 VITALS
DIASTOLIC BLOOD PRESSURE: 79 MMHG | TEMPERATURE: 97.7 F | SYSTOLIC BLOOD PRESSURE: 123 MMHG | OXYGEN SATURATION: 100 % | RESPIRATION RATE: 1 BRPM

## 2019-08-05 DIAGNOSIS — C50.912 BILATERAL MALIGNANT NEOPLASM OF BREAST IN FEMALE, ESTROGEN RECEPTOR POSITIVE, UNSPECIFIED SITE OF BREAST (HCC): Primary | ICD-10-CM

## 2019-08-05 DIAGNOSIS — Z17.0 BILATERAL MALIGNANT NEOPLASM OF BREAST IN FEMALE, ESTROGEN RECEPTOR POSITIVE, UNSPECIFIED SITE OF BREAST (HCC): Primary | ICD-10-CM

## 2019-08-05 DIAGNOSIS — C50.911 BILATERAL MALIGNANT NEOPLASM OF BREAST IN FEMALE, ESTROGEN RECEPTOR POSITIVE, UNSPECIFIED SITE OF BREAST (HCC): Primary | ICD-10-CM

## 2019-08-05 PROCEDURE — 88342 IMHCHEM/IMCYTCHM 1ST ANTB: CPT

## 2019-08-05 PROCEDURE — 2580000003 HC RX 258: Performed by: NURSE ANESTHETIST, CERTIFIED REGISTERED

## 2019-08-05 PROCEDURE — 3600000002 HC SURGERY LEVEL 2 BASE: Performed by: SURGERY

## 2019-08-05 PROCEDURE — 88307 TISSUE EXAM BY PATHOLOGIST: CPT

## 2019-08-05 PROCEDURE — 6360000002 HC RX W HCPCS: Performed by: ANESTHESIOLOGY

## 2019-08-05 PROCEDURE — 2580000003 HC RX 258

## 2019-08-05 PROCEDURE — 2500000003 HC RX 250 WO HCPCS: Performed by: NURSE ANESTHETIST, CERTIFIED REGISTERED

## 2019-08-05 PROCEDURE — 3700000001 HC ADD 15 MINUTES (ANESTHESIA): Performed by: SURGERY

## 2019-08-05 PROCEDURE — 2709999900 HC NON-CHARGEABLE SUPPLY: Performed by: SURGERY

## 2019-08-05 PROCEDURE — 19120 REMOVAL OF BREAST LESION: CPT | Performed by: SURGERY

## 2019-08-05 PROCEDURE — 6360000002 HC RX W HCPCS: Performed by: NURSE ANESTHETIST, CERTIFIED REGISTERED

## 2019-08-05 PROCEDURE — 3600000012 HC SURGERY LEVEL 2 ADDTL 15MIN: Performed by: SURGERY

## 2019-08-05 PROCEDURE — 7100000010 HC PHASE II RECOVERY - FIRST 15 MIN: Performed by: SURGERY

## 2019-08-05 PROCEDURE — 7100000001 HC PACU RECOVERY - ADDTL 15 MIN: Performed by: SURGERY

## 2019-08-05 PROCEDURE — 7100000000 HC PACU RECOVERY - FIRST 15 MIN: Performed by: SURGERY

## 2019-08-05 PROCEDURE — 76942 ECHO GUIDE FOR BIOPSY: CPT | Performed by: ANESTHESIOLOGY

## 2019-08-05 PROCEDURE — 3700000000 HC ANESTHESIA ATTENDED CARE: Performed by: SURGERY

## 2019-08-05 PROCEDURE — 7100000011 HC PHASE II RECOVERY - ADDTL 15 MIN: Performed by: SURGERY

## 2019-08-05 RX ORDER — PROPOFOL 10 MG/ML
INJECTION, EMULSION INTRAVENOUS PRN
Status: DISCONTINUED | OUTPATIENT
Start: 2019-08-05 | End: 2019-08-05 | Stop reason: SDUPTHER

## 2019-08-05 RX ORDER — HYDROMORPHONE HCL 110MG/55ML
0.5 PATIENT CONTROLLED ANALGESIA SYRINGE INTRAVENOUS EVERY 5 MIN PRN
Status: DISCONTINUED | OUTPATIENT
Start: 2019-08-05 | End: 2019-08-05 | Stop reason: HOSPADM

## 2019-08-05 RX ORDER — LABETALOL 20 MG/4 ML (5 MG/ML) INTRAVENOUS SYRINGE
5 EVERY 10 MIN PRN
Status: DISCONTINUED | OUTPATIENT
Start: 2019-08-05 | End: 2019-08-05 | Stop reason: HOSPADM

## 2019-08-05 RX ORDER — ROPIVACAINE HYDROCHLORIDE 2 MG/ML
INJECTION, SOLUTION EPIDURAL; INFILTRATION; PERINEURAL PRN
Status: DISCONTINUED | OUTPATIENT
Start: 2019-08-05 | End: 2019-08-05 | Stop reason: SDUPTHER

## 2019-08-05 RX ORDER — EPHEDRINE SULFATE 50 MG/ML
INJECTION, SOLUTION INTRAVENOUS PRN
Status: DISCONTINUED | OUTPATIENT
Start: 2019-08-05 | End: 2019-08-05 | Stop reason: SDUPTHER

## 2019-08-05 RX ORDER — SODIUM CHLORIDE, SODIUM LACTATE, POTASSIUM CHLORIDE, CALCIUM CHLORIDE 600; 310; 30; 20 MG/100ML; MG/100ML; MG/100ML; MG/100ML
INJECTION, SOLUTION INTRAVENOUS
Status: COMPLETED
Start: 2019-08-05 | End: 2019-08-05

## 2019-08-05 RX ORDER — DEXAMETHASONE SODIUM PHOSPHATE 4 MG/ML
INJECTION, SOLUTION INTRA-ARTICULAR; INTRALESIONAL; INTRAMUSCULAR; INTRAVENOUS; SOFT TISSUE PRN
Status: DISCONTINUED | OUTPATIENT
Start: 2019-08-05 | End: 2019-08-05 | Stop reason: SDUPTHER

## 2019-08-05 RX ORDER — HYDROCODONE BITARTRATE AND ACETAMINOPHEN 5; 325 MG/1; MG/1
1 TABLET ORAL EVERY 6 HOURS PRN
Qty: 10 TABLET | Refills: 0 | Status: SHIPPED | OUTPATIENT
Start: 2019-08-05 | End: 2019-08-08

## 2019-08-05 RX ORDER — ONDANSETRON 2 MG/ML
INJECTION INTRAMUSCULAR; INTRAVENOUS PRN
Status: DISCONTINUED | OUTPATIENT
Start: 2019-08-05 | End: 2019-08-05 | Stop reason: SDUPTHER

## 2019-08-05 RX ORDER — HYDRALAZINE HYDROCHLORIDE 20 MG/ML
5 INJECTION INTRAMUSCULAR; INTRAVENOUS EVERY 10 MIN PRN
Status: DISCONTINUED | OUTPATIENT
Start: 2019-08-05 | End: 2019-08-05 | Stop reason: HOSPADM

## 2019-08-05 RX ORDER — MIDAZOLAM HYDROCHLORIDE 1 MG/ML
INJECTION INTRAMUSCULAR; INTRAVENOUS PRN
Status: DISCONTINUED | OUTPATIENT
Start: 2019-08-05 | End: 2019-08-05 | Stop reason: SDUPTHER

## 2019-08-05 RX ORDER — KETOROLAC TROMETHAMINE 10 MG/1
10 TABLET, FILM COATED ORAL EVERY 6 HOURS PRN
Qty: 20 TABLET | Refills: 0 | Status: SHIPPED | OUTPATIENT
Start: 2019-08-05 | End: 2019-09-05

## 2019-08-05 RX ORDER — CEFAZOLIN SODIUM 1 G/50ML
INJECTION, SOLUTION INTRAVENOUS PRN
Status: DISCONTINUED | OUTPATIENT
Start: 2019-08-05 | End: 2019-08-05 | Stop reason: SDUPTHER

## 2019-08-05 RX ORDER — PROMETHAZINE HYDROCHLORIDE 25 MG/ML
6.25 INJECTION, SOLUTION INTRAMUSCULAR; INTRAVENOUS
Status: DISCONTINUED | OUTPATIENT
Start: 2019-08-05 | End: 2019-08-05 | Stop reason: HOSPADM

## 2019-08-05 RX ORDER — MORPHINE SULFATE 2 MG/ML
2 INJECTION, SOLUTION INTRAMUSCULAR; INTRAVENOUS ONCE
Status: COMPLETED | OUTPATIENT
Start: 2019-08-05 | End: 2019-08-05

## 2019-08-05 RX ORDER — KETOROLAC TROMETHAMINE 30 MG/ML
INJECTION, SOLUTION INTRAMUSCULAR; INTRAVENOUS PRN
Status: DISCONTINUED | OUTPATIENT
Start: 2019-08-05 | End: 2019-08-05 | Stop reason: SDUPTHER

## 2019-08-05 RX ORDER — SODIUM CHLORIDE, SODIUM LACTATE, POTASSIUM CHLORIDE, CALCIUM CHLORIDE 600; 310; 30; 20 MG/100ML; MG/100ML; MG/100ML; MG/100ML
INJECTION, SOLUTION INTRAVENOUS CONTINUOUS PRN
Status: DISCONTINUED | OUTPATIENT
Start: 2019-08-05 | End: 2019-08-05 | Stop reason: SDUPTHER

## 2019-08-05 RX ORDER — ACETAMINOPHEN 10 MG/ML
INJECTION, SOLUTION INTRAVENOUS PRN
Status: DISCONTINUED | OUTPATIENT
Start: 2019-08-05 | End: 2019-08-05 | Stop reason: SDUPTHER

## 2019-08-05 RX ORDER — DEXAMETHASONE SODIUM PHOSPHATE 10 MG/ML
INJECTION, SOLUTION INTRAMUSCULAR; INTRAVENOUS PRN
Status: DISCONTINUED | OUTPATIENT
Start: 2019-08-05 | End: 2019-08-05 | Stop reason: SDUPTHER

## 2019-08-05 RX ORDER — SODIUM CHLORIDE, SODIUM LACTATE, POTASSIUM CHLORIDE, CALCIUM CHLORIDE 600; 310; 30; 20 MG/100ML; MG/100ML; MG/100ML; MG/100ML
INJECTION, SOLUTION INTRAVENOUS CONTINUOUS
Status: DISCONTINUED | OUTPATIENT
Start: 2019-08-05 | End: 2019-08-05 | Stop reason: HOSPADM

## 2019-08-05 RX ORDER — MEPERIDINE HYDROCHLORIDE 25 MG/ML
12.5 INJECTION INTRAMUSCULAR; INTRAVENOUS; SUBCUTANEOUS EVERY 5 MIN PRN
Status: DISCONTINUED | OUTPATIENT
Start: 2019-08-05 | End: 2019-08-05 | Stop reason: HOSPADM

## 2019-08-05 RX ORDER — FENTANYL CITRATE 50 UG/ML
50 INJECTION, SOLUTION INTRAMUSCULAR; INTRAVENOUS EVERY 5 MIN PRN
Status: DISCONTINUED | OUTPATIENT
Start: 2019-08-05 | End: 2019-08-05 | Stop reason: HOSPADM

## 2019-08-05 RX ORDER — KETOROLAC TROMETHAMINE 10 MG/1
10 TABLET, FILM COATED ORAL EVERY 6 HOURS PRN
Status: DISCONTINUED | OUTPATIENT
Start: 2019-08-05 | End: 2019-08-05 | Stop reason: HOSPADM

## 2019-08-05 RX ORDER — DIPHENHYDRAMINE HYDROCHLORIDE 50 MG/ML
12.5 INJECTION INTRAMUSCULAR; INTRAVENOUS
Status: DISCONTINUED | OUTPATIENT
Start: 2019-08-05 | End: 2019-08-05 | Stop reason: HOSPADM

## 2019-08-05 RX ORDER — FENTANYL CITRATE 50 UG/ML
INJECTION, SOLUTION INTRAMUSCULAR; INTRAVENOUS PRN
Status: DISCONTINUED | OUTPATIENT
Start: 2019-08-05 | End: 2019-08-05 | Stop reason: SDUPTHER

## 2019-08-05 RX ORDER — LIDOCAINE HYDROCHLORIDE 20 MG/ML
INJECTION, SOLUTION INTRAVENOUS PRN
Status: DISCONTINUED | OUTPATIENT
Start: 2019-08-05 | End: 2019-08-05 | Stop reason: SDUPTHER

## 2019-08-05 RX ADMIN — LIDOCAINE HYDROCHLORIDE 100 MG: 20 INJECTION, SOLUTION INTRAVENOUS at 10:50

## 2019-08-05 RX ADMIN — KETOROLAC TROMETHAMINE 30 MG: 30 INJECTION, SOLUTION INTRAMUSCULAR; INTRAVENOUS at 12:00

## 2019-08-05 RX ADMIN — EPHEDRINE SULFATE 10 MG: 50 INJECTION, SOLUTION INTRAMUSCULAR; INTRAVENOUS; SUBCUTANEOUS at 11:05

## 2019-08-05 RX ADMIN — FENTANYL CITRATE 25 MCG: 50 INJECTION, SOLUTION INTRAMUSCULAR; INTRAVENOUS at 12:46

## 2019-08-05 RX ADMIN — MORPHINE SULFATE 2 MG: 2 INJECTION, SOLUTION INTRAMUSCULAR; INTRAVENOUS at 13:40

## 2019-08-05 RX ADMIN — FENTANYL CITRATE 25 MCG: 50 INJECTION INTRAMUSCULAR; INTRAVENOUS at 11:15

## 2019-08-05 RX ADMIN — DEXAMETHASONE SODIUM PHOSPHATE 4 MG: 4 INJECTION, SOLUTION INTRAMUSCULAR; INTRAVENOUS at 10:50

## 2019-08-05 RX ADMIN — SODIUM CHLORIDE, POTASSIUM CHLORIDE, SODIUM LACTATE AND CALCIUM CHLORIDE: 600; 310; 30; 20 INJECTION, SOLUTION INTRAVENOUS at 09:31

## 2019-08-05 RX ADMIN — CEFAZOLIN SODIUM 1 G: 1 INJECTION, SOLUTION INTRAVENOUS at 10:51

## 2019-08-05 RX ADMIN — FENTANYL CITRATE 25 MCG: 50 INJECTION INTRAMUSCULAR; INTRAVENOUS at 11:38

## 2019-08-05 RX ADMIN — MIDAZOLAM HYDROCHLORIDE 2 MG: 1 INJECTION, SOLUTION INTRAMUSCULAR; INTRAVENOUS at 09:44

## 2019-08-05 RX ADMIN — FENTANYL CITRATE 25 MCG: 50 INJECTION, SOLUTION INTRAMUSCULAR; INTRAVENOUS at 12:35

## 2019-08-05 RX ADMIN — DEXAMETHASONE SODIUM PHOSPHATE 20 MG: 10 INJECTION, SOLUTION INTRAMUSCULAR; INTRAVENOUS at 09:52

## 2019-08-05 RX ADMIN — ACETAMINOPHEN 1000 MG: 10 INJECTION, SOLUTION INTRAVENOUS at 11:00

## 2019-08-05 RX ADMIN — FENTANYL CITRATE 100 MCG: 50 INJECTION INTRAMUSCULAR; INTRAVENOUS at 09:44

## 2019-08-05 RX ADMIN — ONDANSETRON 4 MG: 2 INJECTION INTRAMUSCULAR; INTRAVENOUS at 10:50

## 2019-08-05 RX ADMIN — ROPIVACAINE HYDROCHLORIDE 150 MG: 2 INJECTION, SOLUTION EPIDURAL; INFILTRATION at 09:52

## 2019-08-05 RX ADMIN — PROPOFOL 200 MG: 10 INJECTION, EMULSION INTRAVENOUS at 10:50

## 2019-08-05 RX ADMIN — SODIUM CHLORIDE, SODIUM LACTATE, POTASSIUM CHLORIDE, CALCIUM CHLORIDE: 600; 310; 30; 20 INJECTION, SOLUTION INTRAVENOUS at 09:31

## 2019-08-05 RX ADMIN — FENTANYL CITRATE 50 MCG: 50 INJECTION INTRAMUSCULAR; INTRAVENOUS at 10:50

## 2019-08-05 RX ADMIN — SODIUM CHLORIDE, POTASSIUM CHLORIDE, SODIUM LACTATE AND CALCIUM CHLORIDE: 600; 310; 30; 20 INJECTION, SOLUTION INTRAVENOUS at 10:53

## 2019-08-05 ASSESSMENT — PULMONARY FUNCTION TESTS
PIF_VALUE: 12
PIF_VALUE: 12
PIF_VALUE: 11
PIF_VALUE: 2
PIF_VALUE: 12
PIF_VALUE: 15
PIF_VALUE: 16
PIF_VALUE: 15
PIF_VALUE: 12
PIF_VALUE: 10
PIF_VALUE: 15
PIF_VALUE: 10
PIF_VALUE: 10
PIF_VALUE: 11
PIF_VALUE: 11
PIF_VALUE: 14
PIF_VALUE: 10
PIF_VALUE: 13
PIF_VALUE: 16
PIF_VALUE: 1
PIF_VALUE: 12
PIF_VALUE: 15
PIF_VALUE: 13
PIF_VALUE: 27
PIF_VALUE: 15
PIF_VALUE: 12
PIF_VALUE: 14
PIF_VALUE: 14
PIF_VALUE: 15
PIF_VALUE: 15
PIF_VALUE: 14
PIF_VALUE: 6
PIF_VALUE: 15
PIF_VALUE: 14
PIF_VALUE: 12
PIF_VALUE: 1
PIF_VALUE: 14
PIF_VALUE: 0
PIF_VALUE: 15
PIF_VALUE: 12
PIF_VALUE: 16
PIF_VALUE: 14
PIF_VALUE: 11
PIF_VALUE: 10
PIF_VALUE: 0
PIF_VALUE: 5
PIF_VALUE: 12
PIF_VALUE: 12
PIF_VALUE: 18
PIF_VALUE: 15
PIF_VALUE: 10
PIF_VALUE: 12
PIF_VALUE: 13
PIF_VALUE: 1
PIF_VALUE: 15
PIF_VALUE: 0
PIF_VALUE: 7
PIF_VALUE: 12
PIF_VALUE: 13
PIF_VALUE: 12
PIF_VALUE: 7
PIF_VALUE: 12
PIF_VALUE: 0
PIF_VALUE: 15
PIF_VALUE: 14
PIF_VALUE: 15
PIF_VALUE: 15
PIF_VALUE: 12
PIF_VALUE: 1
PIF_VALUE: 10
PIF_VALUE: 12
PIF_VALUE: 14
PIF_VALUE: 10
PIF_VALUE: 12
PIF_VALUE: 12
PIF_VALUE: 10
PIF_VALUE: 14
PIF_VALUE: 15
PIF_VALUE: 13
PIF_VALUE: 12
PIF_VALUE: 12

## 2019-08-05 ASSESSMENT — PAIN DESCRIPTION - LOCATION: LOCATION: BREAST

## 2019-08-05 ASSESSMENT — PAIN DESCRIPTION - ORIENTATION: ORIENTATION: RIGHT;LEFT

## 2019-08-05 ASSESSMENT — PAIN SCALES - GENERAL
PAINLEVEL_OUTOF10: 4
PAINLEVEL_OUTOF10: 3
PAINLEVEL_OUTOF10: 4
PAINLEVEL_OUTOF10: 8
PAINLEVEL_OUTOF10: 2

## 2019-08-05 ASSESSMENT — PAIN DESCRIPTION - PAIN TYPE: TYPE: SURGICAL PAIN

## 2019-08-05 NOTE — PROGRESS NOTES
Returned to room from PACU. Tearful, C/O bilateral breast discomfort. Bilateral breast incisions well approximated and open to air. Family at bedside. Water po.

## 2019-08-05 NOTE — PROGRESS NOTES
Patient transported to same-day. Vital signs stable; pain controlled; no nausea; awake and appropriate; tolerating po ice chips.   Bedside report given to jose angel lopez.

## 2019-08-05 NOTE — OP NOTE
Bilateral Breast Lumpectomy Site Re-excision    Indications: This patient presents with history of a bilateral breast cancer s/p lumpectomies. There were positive margins with both lesions. The lumpectomy site will be re-excised. .    Pre-operative Diagnosis: bilateral breast cancer    Post-operative Diagnosis: bilateral breast cancer    Surgeon: Nimesh Taylor     First Assistant: JULIANNA Pepe  The use of a first assistant was necessary for the proper positioning, prepping, and draping of the patient, intraoperative retraction and suctioning for visualization, passing sutures and implants, stapling bowel and vessels using devises when necessary. Anesthesia: General LMA anesthesia    ASA Class: 2    Procedure Details   The patient was seen in the Holding Room. The risks, benefits, complications, treatment options, and expected outcomes were discussed with the patient. The possibilities of reaction to medication, pulmonary aspiration, bleeding, infection, the need for additional procedures, failure to diagnose a condition, and creating a complication requiring transfusion or operation were discussed with the patient. The patient concurred with the proposed plan, giving informed consent. The site of surgery properly noted/marked. The patient was taken to Operating Room, identified as Dary Lira, and the procedure verified as lumpectomy. A Time Out was held and the above information confirmed. After induction of anesthesia, the bilateral  breast and chest were prepped and draped in standard fashion. The re-excision was done by incising the prior incisions and going into the lumpectomy cavities. Both cavities were re-excised and marked with the Vector color system. The Biosorbs were also removed. Bleeding was controlled with cautery. The incisions were closed with 3-0 and 5-0 vicryl. Skin glue was applied. Sterile dressings were applied.  At the end of the operation, all sponge,

## 2019-08-05 NOTE — ANESTHESIA PRE PROCEDURE
History:        Diagnosis Date    Breast cancer ([facility] 06/24/2019    DCIS on the left and infiltrating lobular on the right    Bronchitis 02/2019    [facility] 2016    right leg    Pulmonary embolism ([facility] 2016    saddle       Past Surgical History:        Procedure Laterality Date    BREAST LUMPECTOMY Bilateral 7/24/2019    BREAST LUMPECTOMY PARTIAL MASTECTOMY RIGHT SENITINEL NODE BX LEFT LUMPECTOMY NEEDLE LOC BILATERAL BI ZORB performed by Darcy Shin MD at [facility], DIAGNOSTIC  2009    \"dilitation for difficulty swallowing food\"  done in [address] [place]      fractured arm age 15       Social History:    Social History     Tobacco Use    Smoking status: Former Smoker     Packs/day: 0.50     Start date: 1975     Last attempt to quit: 6/14/2016     Years since quitting: 3.1    Smokeless tobacco: Never Used   Substance Use Topics    Alcohol use: No     Comment: caffeine-2/day                                Counseling given: Not Answered      Vital Signs (Current):   Vitals:    08/02/19 0826 08/05/19 0914 08/05/19 0955 08/05/19 1000   BP:  139/80     Pulse:  63 56 58   Resp:  17 16 16   Temp:  36.7 °C (98 °F)     TempSrc:  Temporal     SpO2:  96% 98% 98%   Weight: 229 lb (103.9 kg) 229 lb (103.9 kg)     Height: 5' 6\" (1.676 m) 5' 6\" (1.676 m)                                                BP Readings from Last 3 Encounters:   08/05/19 139/80   08/01/19 (!) 145/98   07/24/19 (!) 163/89       NPO Status: Time of last liquid consumption: 0000                        Time of last solid consumption: 2330                        Date of last liquid consumption: 08/05/19                        Date of last solid food consumption: 08/04/19    BMI:   Wt Readings from Last 3 Encounters:   08/05/19 229 lb (103.9 kg)   07/19/19 231 lb (104.8 kg)   07/09/19 229 lb 12.8 oz (104.2 kg)     Body mass index is 36.96 kg/m².     CBC:   Lab Results   Component Value Date    WBC 7.2 07/19/2019    RBC 4.68 07/19/2019    HGB 13.9 07/19/2019    HCT 43.8 07/19/2019    MCV 93.6 07/19/2019    RDW 13.2 07/19/2019     07/19/2019       CMP:   Lab Results   Component Value Date     07/19/2019    K 4.3 07/19/2019     07/19/2019    CO2 27 07/19/2019    BUN 14 07/19/2019    CREATININE 1.2 07/19/2019    GFRAA 55 07/19/2019    AGRATIO 1.3 04/19/2017    LABGLOM 45 07/19/2019    GLUCOSE 103 07/19/2019    CALCIUM 9.2 07/19/2019    BILITOT 0.7 04/19/2017    ALKPHOS 75 04/19/2017    AST 17 04/19/2017    ALT 11 04/19/2017       POC Tests: No results for input(s): POCGLU, POCNA, POCK, POCCL, POCBUN, POCHEMO, POCHCT in the last 72 hours. Coags: No results found for: PROTIME, INR, APTT    HCG (If Applicable): No results found for: PREGTESTUR, PREGSERUM, HCG, HCGQUANT     ABGs: No results found for: PHART, PO2ART, TTX4AAR, GMJ4PCG, BEART, F0AAYIED     Type & Screen (If Applicable):  No results found for: LABABO, [address]    Anesthesia Evaluation  Patient summary reviewed  Airway: Mallampati: III       Mouth opening: > = 3 FB Dental:          Pulmonary:normal exam                              ROS comment: Saddle pulmonary embolism    H/O bronchitis   Cardiovascular:                      Neuro/Psych:               GI/Hepatic/Renal:   (+) morbid obesity          Endo/Other:    (+) malignancy/cancer. ROS comment: Breast CA: DCIS on the left and infiltrating lobular on the right Abdominal:           Vascular:   + DVT, PE.        ROS comment: DVT right leg with pregnancy. Anesthesia Plan      general     ASA 3       Induction: intravenous. MIPS: Postoperative opioids intended and Prophylactic antiemetics administered. Attending anesthesiologist reviewed and agrees with Pre Eval content         Pre Anesthesia Assessment complete.  Chart reviewed on 8/5/2019      Mortimer Haskell, APRN - CRNA   8/5/2019

## 2019-08-13 ENCOUNTER — OFFICE VISIT (OUTPATIENT)
Dept: SURGERY | Age: 64
End: 2019-08-13

## 2019-08-13 VITALS — SYSTOLIC BLOOD PRESSURE: 130 MMHG | OXYGEN SATURATION: 99 % | DIASTOLIC BLOOD PRESSURE: 88 MMHG | HEART RATE: 67 BPM

## 2019-08-13 DIAGNOSIS — D05.12 DUCTAL CARCINOMA IN SITU (DCIS) OF LEFT BREAST: Primary | ICD-10-CM

## 2019-08-13 DIAGNOSIS — C50.919 INVASIVE LOBULAR CARCINOMA OF BREAST IN FEMALE (HCC): ICD-10-CM

## 2019-08-13 DIAGNOSIS — Z09 POSTOP CHECK: ICD-10-CM

## 2019-08-13 PROCEDURE — 99024 POSTOP FOLLOW-UP VISIT: CPT | Performed by: SURGERY

## 2019-08-19 ENCOUNTER — TELEPHONE (OUTPATIENT)
Dept: SURGERY | Age: 64
End: 2019-08-19

## 2019-08-27 ENCOUNTER — OFFICE VISIT (OUTPATIENT)
Dept: SURGERY | Age: 64
End: 2019-08-27

## 2019-08-27 VITALS — DIASTOLIC BLOOD PRESSURE: 90 MMHG | HEART RATE: 57 BPM | SYSTOLIC BLOOD PRESSURE: 138 MMHG | OXYGEN SATURATION: 99 %

## 2019-08-27 DIAGNOSIS — D05.12 DUCTAL CARCINOMA IN SITU (DCIS) OF LEFT BREAST: ICD-10-CM

## 2019-08-27 DIAGNOSIS — Z09 POSTOP CHECK: ICD-10-CM

## 2019-08-27 DIAGNOSIS — C50.919 INVASIVE LOBULAR CARCINOMA OF BREAST IN FEMALE (HCC): Primary | ICD-10-CM

## 2019-08-27 PROCEDURE — 99024 POSTOP FOLLOW-UP VISIT: CPT | Performed by: SURGERY

## 2019-09-05 ENCOUNTER — HOSPITAL ENCOUNTER (OUTPATIENT)
Dept: RADIATION ONCOLOGY | Age: 64
Discharge: HOME | End: 2019-09-05
Payer: COMMERCIAL

## 2019-09-05 ENCOUNTER — HOSPITAL ENCOUNTER (OUTPATIENT)
Age: 64
Setting detail: SPECIMEN
Discharge: HOME | End: 2019-09-05
Payer: COMMERCIAL

## 2019-09-05 VITALS
BODY MASS INDEX: 36.96 KG/M2 | HEART RATE: 64 BPM | DIASTOLIC BLOOD PRESSURE: 77 MMHG | WEIGHT: 230 LBS | HEIGHT: 66 IN | SYSTOLIC BLOOD PRESSURE: 134 MMHG | TEMPERATURE: 97.8 F | RESPIRATION RATE: 16 BRPM | OXYGEN SATURATION: 98 %

## 2019-09-05 DIAGNOSIS — D05.12 DUCTAL CARCINOMA IN SITU OF LEFT BREAST: ICD-10-CM

## 2019-09-05 DIAGNOSIS — Z17.0 MALIGNANT NEOPLASM OF LOWER-OUTER QUADRANT OF RIGHT BREAST OF FEMALE, ESTROGEN RECEPTOR POSITIVE (HCC): ICD-10-CM

## 2019-09-05 DIAGNOSIS — C50.511 MALIGNANT NEOPLASM OF LOWER-OUTER QUADRANT OF RIGHT BREAST OF FEMALE, ESTROGEN RECEPTOR POSITIVE (HCC): ICD-10-CM

## 2019-09-05 LAB
ALBUMIN SERPL-MCNC: 4.5 GM/DL (ref 3.4–5)
ALP BLD-CCNC: 80 IU/L (ref 40–129)
ALT SERPL-CCNC: 9 U/L (ref 10–40)
ANION GAP SERPL CALCULATED.3IONS-SCNC: 11 MMOL/L (ref 4–16)
AST SERPL-CCNC: 14 IU/L (ref 15–37)
BASOPHILS ABSOLUTE: 0 K/CU MM
BASOPHILS RELATIVE PERCENT: 0.4 % (ref 0–1)
BILIRUB SERPL-MCNC: 0.5 MG/DL (ref 0–1)
BUN BLDV-MCNC: 15 MG/DL (ref 6–23)
CALCIUM SERPL-MCNC: 9.1 MG/DL (ref 8.3–10.6)
CHLORIDE BLD-SCNC: 104 MMOL/L (ref 99–110)
CO2: 25 MMOL/L (ref 21–32)
CREAT SERPL-MCNC: 1.1 MG/DL (ref 0.6–1.1)
DIFFERENTIAL TYPE: ABNORMAL
EOSINOPHILS ABSOLUTE: 0.4 K/CU MM
EOSINOPHILS RELATIVE PERCENT: 4.8 % (ref 0–3)
GFR AFRICAN AMERICAN: >60 ML/MIN/1.73M2
GFR NON-AFRICAN AMERICAN: 50 ML/MIN/1.73M2
GLUCOSE BLD-MCNC: 108 MG/DL (ref 70–99)
HCT VFR BLD CALC: 44.2 % (ref 37–47)
HEMOGLOBIN: 13.8 GM/DL (ref 12.5–16)
IMMATURE NEUTROPHIL %: 0.6 % (ref 0–0.43)
LYMPHOCYTES ABSOLUTE: 2.1 K/CU MM
LYMPHOCYTES RELATIVE PERCENT: 26.1 % (ref 24–44)
MCH RBC QN AUTO: 29.8 PG (ref 27–31)
MCHC RBC AUTO-ENTMCNC: 31.2 % (ref 32–36)
MCV RBC AUTO: 95.5 FL (ref 78–100)
MONOCYTES ABSOLUTE: 0.6 K/CU MM
MONOCYTES RELATIVE PERCENT: 7.1 % (ref 0–4)
NUCLEATED RBC %: 0 %
PDW BLD-RTO: 13.5 % (ref 11.7–14.9)
PLATELET # BLD: 305 K/CU MM (ref 140–440)
PMV BLD AUTO: 11.6 FL (ref 7.5–11.1)
POTASSIUM SERPL-SCNC: 4.5 MMOL/L (ref 3.5–5.1)
RBC # BLD: 4.63 M/CU MM (ref 4.2–5.4)
SEGMENTED NEUTROPHILS ABSOLUTE COUNT: 4.9 K/CU MM
SEGMENTED NEUTROPHILS RELATIVE PERCENT: 61 % (ref 36–66)
SODIUM BLD-SCNC: 140 MMOL/L (ref 135–145)
TOTAL IMMATURE NEUTOROPHIL: 0.05 K/CU MM
TOTAL NUCLEATED RBC: 0 K/CU MM
TOTAL PROTEIN: 7.1 GM/DL (ref 6.4–8.2)
WBC # BLD: 8.1 K/CU MM (ref 4–10.5)

## 2019-09-05 PROCEDURE — 80053 COMPREHEN METABOLIC PANEL: CPT

## 2019-09-05 PROCEDURE — 85025 COMPLETE CBC W/AUTO DIFF WBC: CPT

## 2019-09-05 PROCEDURE — 86300 IMMUNOASSAY TUMOR CA 15-3: CPT

## 2019-09-05 NOTE — PROGRESS NOTES
Radiation Oncology Consultation  Encounter Date: 2019 10:28 AM    Ms. Beny Davis is a 61 y.o. female  : 1955  MRN: [id number]  Acct Number: [de-identified]  Requesting Provider: Dr. Travis Armenta: Maverick Dunbar:   Primary Care: Modesto Baugh MD   Medical Oncologist: No primary care provider on file. REASON FOR CONSULTATION:   DIAGNOSIS:      Cancer Staging  Ductal carcinoma in situ of left breast  Staging form: Breast, AJCC 8th Edition  - Clinical: Stage 0 (cTis (DCIS), cN0, cM0, G2, ER+, [place]+, HER2-) - Unsigned    Malignant neoplasm of lower-outer quadrant of right female breast ([facility]  Staging form: Breast, AJCC 8th Edition  - Pathologic: Stage IB (pT3, pN0, cM0, G2, ER+, [place]+, HER2-) - Unsigned         WORKUP AND TX COURSE:     Ductal carcinoma in situ of left breast    2019 Initial Diagnosis     Ductal carcinoma in situ of left breast      2019 Surgery     Lumpectomy  40mm DCIS  Close <1mm margin      2019 Surgery     Re-excision  (-) margins        Malignant neoplasm of lower-outer quadrant of right female breast ([facility] [address]    2019 Initial Diagnosis     Malignant neoplasm of lower-outer quadrant of right female breast ([facility] [address]      2019 Surgery     Right Lumpectomy + Rt ALND  63mm Invasive Lobular Ca, 0/10 LNs  (+) margins, (-) LVSI      2019 Surgery     Re-excision   (-) margins           HPI:   Beny Davis is a 61 y.o. female with the above referenced diagnosis. She initially palpated a mass in the right breast leading to workup with mammography showing potential abnormalities in the bilateral breasts. Biopsies were performed showing DCIS in the left breast and invasive lobular carcinoma in the right breast.  She underwent bilateral lumpectomies on the dates above followed by reexcision for close and positive margins. Left DCIS measured 40 mm, grade 2, +/+/-.   Right sided invasive lobular carcinoma measured 63 mm, grade 2, +/+/-, lumpectomy scars bilaterally with mild tenderness on examination and no underlying fibrosis or fluid collection. Nipples everted bilaterally. No palpable abnormalities otherwise. LABORATORY STUDIES:   CBC:   Lab Results   Component Value Date    WBC 7.2 07/19/2019    RBC 4.68 07/19/2019    HGB 13.9 07/19/2019    HCT 43.8 07/19/2019    MCV 93.6 07/19/2019    MCH 29.7 07/19/2019    MCHC 31.7 07/19/2019    RDW 13.2 07/19/2019     07/19/2019    MPV 10.8 07/19/2019     CMP:  Lab Results   Component Value Date     07/19/2019    K 4.3 07/19/2019     07/19/2019    CO2 27 07/19/2019    BUN 14 07/19/2019    CREATININE 1.2 07/19/2019    GFRAA 55 07/19/2019    AGRATIO 1.3 04/19/2017    LABGLOM 45 07/19/2019    GLUCOSE 103 07/19/2019    LABALBU 4.3 04/19/2017    CALCIUM 9.2 07/19/2019    BILITOT 0.7 04/19/2017    ALKPHOS 75 04/19/2017    AST 17 04/19/2017    ALT 11 04/19/2017       RADIOLOGIC STUDIES:              Results for orders placed during the hospital encounter of 06/17/19   US BREAST LIMITED LEFT    Narrative EXAMINATION:  DIAGNOSTIC DIGITAL BILATERAL BREASTS MAMMOGRAM WITH TOMOSYNTHESIS; TARGETED  ULTRASOUND OF THE RIGHT BREAST; TARGETED ULTRASOUND OF THE LEFT BREAST    6/17/2019 11:59 am    TECHNIQUE:  Diagnostic mammography of the bilateral breasts was performed with  tomosynthesis. 2D standard and 3D tomosynthesis combination imaging  performed through both breasts. Computer aided detection was utilized in the  interpretation of this exam.; Targeted ultrasound of the right breast was  performed.; Target ultrasound of the left breast was performed. VIEWS: Standard bilateral views and magnification views of the right breast.    COMPARISON:  None, baseline.     HISTORY:  ORDERING SYSTEM PROVIDED HISTORY: Mass of upper outer quadrant of left breast    Patient detected mass in the right breast.  Physician detected lump in the  left upper outer breast.    FINDINGS:    Mammogram:    The breasts are composed of scattered fibroglandular densities. There is a 1.2 x 2.1 by 2.2 cm mass, spiculated, in the right 9:00 position,  posterior depth. This is at the area of palpable concern. Left breast calcifications are scattered and punctate. No correlate for doctor's palpable concern in the left upper outer breast.    There is a lobulated mass in the left central breast, measuring 0.9 x 0.8 x  1.2 cm. Ultrasound:    Sonogram of the right 8:00 position, 6 cm from the nipple, demonstrates a  nonvascular irregular hypoechoic non parallel mass, measuring 1.8 x 2.4 x 1.7  cm. Ipsilateral axilla is unremarkable. Left:    Incidental note is made of a cyst at the left 2:00 position, 3 cm from the  nipple. At the same location there is a bilobed hypoechoic nonvascular mass,  measuring 0.4 x 0.2 x 0.3 cm. At the left 3:00 position, 1 cm from the nipple, there is an oval partially  vascular mass with well-circumscribed margins, measuring 0.8 x 0.3 x 0.5 cm. At the left 4:00 position, 1 cm from the nipple, there is a 1.1 x 0.6 x 0.5  cm hypoechoic mass, seen on mammography. At the left 5:00 position, 1 cm from the nipple there is a 1.0 x 0.6 x 0.5 cm  mass, partially vascular. Impression Recommend ultrasound-guided core needle biopsy for right palpable 1.8 cm mass  at the 8 o'clock position. Recommend ultrasound-guided core needle biopsies for masses at the left 4  o'clock and 5 o'clock positions. Six-month follow-up can be performed for the other masses documented in the  left breast.    BIRADS:  BIRADS - CATEGORY 4C    Moderate suspicion for malignancy. Findings demonstrate a suspicious abnormality. Biopsy should be considered at  this time. OVERALL ASSESSMENT - SUSPICIOUS    A letter of notification will be sent to the patient regarding the results. My findings and recommendations were discussed with the patient at the time  of service.     A representative from the mass,  measuring 0.4 x 0.2 x 0.3 cm. At the left 3:00 position, 1 cm from the nipple, there is an oval partially  vascular mass with well-circumscribed margins, measuring 0.8 x 0.3 x 0.5 cm. At the left 4:00 position, 1 cm from the nipple, there is a 1.1 x 0.6 x 0.5  cm hypoechoic mass, seen on mammography. At the left 5:00 position, 1 cm from the nipple there is a 1.0 x 0.6 x 0.5 cm  mass, partially vascular. Impression Recommend ultrasound-guided core needle biopsy for right palpable 1.8 cm mass  at the 8 o'clock position. Recommend ultrasound-guided core needle biopsies for masses at the left 4  o'clock and 5 o'clock positions. Six-month follow-up can be performed for the other masses documented in the  left breast.    BIRADS:  BIRADS - CATEGORY 4C    Moderate suspicion for malignancy. Findings demonstrate a suspicious abnormality. Biopsy should be considered at  this time. OVERALL ASSESSMENT - SUSPICIOUS    A letter of notification will be sent to the patient regarding the results. My findings and recommendations were discussed with the patient at the time  of service. A representative from the radiology department will be contacting your office  and assisting the patient in getting appropriate follow-up. Tyra Mccarthy Results for orders placed during the hospital encounter of 06/24/19   WILMER DIGITAL DIAGNOSTIC W OR WO CAD BILATERAL    Addendum ADDENDUM: Biopsy results from a right 8:00 position mass demonstrates infiltrating lobular carcinoma. Biopsy of a left 4:00 position mass demonstrates ductal carcinoma in situ with calcifications. Findings are concordant. Recommend surgical referral.  Given the amount of calcifications that are scattered throughout the left breast and multiple incidental masses noted as well, an initial MRI with contrast is recommended to guide second-look ultrasound and further biopsy considerations.   Findings were discussed w/ Dr. Rich Bran, who was performed with color parametric mapping, image subtraction,  and 3D reconstructions. COMPARISON:  None. HISTORY:  ORDERING SYSTEM PROVIDED HISTORY: Bilateral malignant neoplasm of breast in  female, estrogen receptor positive, unspecified site of breast [facility])  TECHNOLOGIST PROVIDED HISTORY:  Reason for Exam: presents for the evaluation of bilateral breast cancer. She  has DCIS on the left and infiltrating lobular on the right. Both are ER and  ND positive. Onset of the symptoms was 1 months ago. Patient sought  evaluation because of a breast lump on exam.  Contributing factors include  none. Previous evaluation has included breast biopsy (bilateral) and  mammogram (suspicious). -gfr 47 today --11ml multihance    FINDINGS:  Right breast: There is scattered fibroglandular breast tissue seen within the  right breast.  There is a spiculated mass seen near the 9 o'clock region of  the right breast measuring 2.8 cm x 1.8 cm x 1.5 cm in size there is a  surgical clip associated with this mass consistent with the patient's  biopsy-proven infiltrating lobular carcinoma. The remainder of the right  breast appears unremarkable with no evidence for abnormal enhancement. No  additional masses are seen. There is no skin thickening or nipple  retraction. There is no pathologically enlarged axillary adenopathy. Left breast: There is scattered fibroglandular breast tissue seen within the  left breast.  Near the 4-5 o'clock region within the left breast there is a  post biopsy hematoma measuring 1.5 cm in size. There is a surgical clip  associated with this hematoma this is at the site of the patient's  biopsy-proven ductal carcinoma in situ. There is no evidence for abnormal  enhancement of the breast tissue. There are no masses seen. There is no  skin thickening or nipple retraction there is no pathologically enlarged  axillary adenopathy.     Incidental note is made of a benign cyst within the liver measuring metabolic panel, blood counts, and CA 27.29 to ensure no additional imaging or workup is needed. I have sent a consultation request to medical oncology for evaluation also. I discussed the logistics of planning and delivering radiation treatments along with anticipated potential acute and chronic toxicities including but not limited to: fatigue, skin erythema, desquamation, breast fibrosis, breast density changes, lymphedema, brachial plexopathy, radiation esophagitis, esophageal stricture, pericarditis, pericardial effusion, increased risk of heart disease, risk of secondary malignancy. Thank-you for allowing me to participate in the care of this patient and please do not hesitate to contact me for any questions or concerns. TIME SPENT: 60 minutes spent during this consultation,  >50% spent in counseling/discussion/education.       Electronically signed by Electronically signed by Quintin Madera MD on 9/5/2019 at 10:28 AM

## 2019-09-07 LAB
CA 27.29: 26.1 U/ML (ref 0–40)
CA 27.29: NORMAL U/ML (ref 0–40)

## 2019-10-02 ENCOUNTER — HOSPITAL ENCOUNTER (OUTPATIENT)
Dept: RADIATION ONCOLOGY | Age: 64
Discharge: HOME | End: 2019-10-02
Attending: RADIOLOGY
Payer: COMMERCIAL

## 2019-10-02 PROCEDURE — 77332 RADIATION TREATMENT AID(S): CPT | Performed by: RADIOLOGY

## 2019-10-02 PROCEDURE — 77334 RADIATION TREATMENT AID(S): CPT | Performed by: RADIOLOGY

## 2019-10-02 PROCEDURE — 77470 SPECIAL RADIATION TREATMENT: CPT | Performed by: RADIOLOGY

## 2019-10-15 ENCOUNTER — HOSPITAL ENCOUNTER (OUTPATIENT)
Dept: RADIATION ONCOLOGY | Age: 64
Discharge: HOME | End: 2019-10-15
Attending: RADIOLOGY
Payer: COMMERCIAL

## 2019-10-15 PROCEDURE — 77338 DESIGN MLC DEVICE FOR IMRT: CPT | Performed by: RADIOLOGY

## 2019-10-15 PROCEDURE — 77301 RADIOTHERAPY DOSE PLAN IMRT: CPT | Performed by: RADIOLOGY

## 2019-10-17 ENCOUNTER — HOSPITAL ENCOUNTER (OUTPATIENT)
Dept: RADIATION ONCOLOGY | Age: 64
Discharge: HOME | End: 2019-10-17
Attending: RADIOLOGY
Payer: COMMERCIAL

## 2019-10-17 PROCEDURE — 77014 PR CT GUIDANCE PLACEMENT RAD THERAPY FIELDS: CPT | Performed by: RADIOLOGY

## 2019-10-17 PROCEDURE — G6015 RADIATION TX DELIVERY IMRT: HCPCS | Performed by: RADIOLOGY

## 2019-10-18 PROCEDURE — G6015 RADIATION TX DELIVERY IMRT: HCPCS | Performed by: RADIOLOGY

## 2019-10-18 PROCEDURE — 77014 PR CT GUIDANCE PLACEMENT RAD THERAPY FIELDS: CPT | Performed by: RADIOLOGY

## 2019-10-21 ENCOUNTER — HOSPITAL ENCOUNTER (OUTPATIENT)
Dept: RADIATION ONCOLOGY | Age: 64
Discharge: HOME | End: 2019-10-21
Attending: RADIOLOGY
Payer: COMMERCIAL

## 2019-10-21 ENCOUNTER — HOSPITAL ENCOUNTER (OUTPATIENT)
Dept: RADIATION ONCOLOGY | Age: 64
Discharge: HOME | End: 2019-10-21
Payer: COMMERCIAL

## 2019-10-21 PROCEDURE — G6015 RADIATION TX DELIVERY IMRT: HCPCS | Performed by: RADIOLOGY

## 2019-10-21 PROCEDURE — 77014 PR CT GUIDANCE PLACEMENT RAD THERAPY FIELDS: CPT | Performed by: RADIOLOGY

## 2019-10-21 ASSESSMENT — PAIN SCALES - GENERAL: PAINLEVEL_OUTOF10: 0

## 2019-10-22 ENCOUNTER — HOSPITAL ENCOUNTER (OUTPATIENT)
Dept: RADIATION ONCOLOGY | Age: 64
Discharge: HOME | End: 2019-10-22
Attending: RADIOLOGY
Payer: COMMERCIAL

## 2019-10-22 PROCEDURE — 77014 PR CT GUIDANCE PLACEMENT RAD THERAPY FIELDS: CPT | Performed by: RADIOLOGY

## 2019-10-22 PROCEDURE — G6015 RADIATION TX DELIVERY IMRT: HCPCS | Performed by: RADIOLOGY

## 2019-10-23 ENCOUNTER — HOSPITAL ENCOUNTER (OUTPATIENT)
Dept: RADIATION ONCOLOGY | Age: 64
Discharge: HOME | End: 2019-10-23
Attending: RADIOLOGY
Payer: COMMERCIAL

## 2019-10-23 PROCEDURE — G6015 RADIATION TX DELIVERY IMRT: HCPCS | Performed by: RADIOLOGY

## 2019-10-23 PROCEDURE — 77336 RADIATION PHYSICS CONSULT: CPT | Performed by: RADIOLOGY

## 2019-10-23 PROCEDURE — 77014 PR CT GUIDANCE PLACEMENT RAD THERAPY FIELDS: CPT | Performed by: RADIOLOGY

## 2019-10-24 ENCOUNTER — HOSPITAL ENCOUNTER (OUTPATIENT)
Dept: RADIATION ONCOLOGY | Age: 64
Discharge: HOME | End: 2019-10-24
Attending: RADIOLOGY
Payer: COMMERCIAL

## 2019-10-24 PROCEDURE — 77014 PR CT GUIDANCE PLACEMENT RAD THERAPY FIELDS: CPT | Performed by: RADIOLOGY

## 2019-10-24 PROCEDURE — G6015 RADIATION TX DELIVERY IMRT: HCPCS | Performed by: RADIOLOGY

## 2019-10-25 ENCOUNTER — HOSPITAL ENCOUNTER (OUTPATIENT)
Dept: RADIATION ONCOLOGY | Age: 64
Discharge: HOME | End: 2019-10-25
Attending: RADIOLOGY
Payer: COMMERCIAL

## 2019-10-25 PROCEDURE — G6015 RADIATION TX DELIVERY IMRT: HCPCS | Performed by: RADIOLOGY

## 2019-10-25 PROCEDURE — 77014 PR CT GUIDANCE PLACEMENT RAD THERAPY FIELDS: CPT | Performed by: RADIOLOGY

## 2019-10-28 ENCOUNTER — HOSPITAL ENCOUNTER (OUTPATIENT)
Dept: RADIATION ONCOLOGY | Age: 64
Discharge: HOME | End: 2019-10-28
Payer: COMMERCIAL

## 2019-10-28 ENCOUNTER — HOSPITAL ENCOUNTER (OUTPATIENT)
Dept: RADIATION ONCOLOGY | Age: 64
Discharge: HOME | End: 2019-10-28
Attending: RADIOLOGY
Payer: COMMERCIAL

## 2019-10-28 VITALS — WEIGHT: 228.8 LBS | BODY MASS INDEX: 36.93 KG/M2

## 2019-10-28 PROCEDURE — 77014 PR CT GUIDANCE PLACEMENT RAD THERAPY FIELDS: CPT | Performed by: RADIOLOGY

## 2019-10-28 PROCEDURE — G6015 RADIATION TX DELIVERY IMRT: HCPCS | Performed by: RADIOLOGY

## 2019-10-28 ASSESSMENT — PAIN SCALES - GENERAL: PAINLEVEL_OUTOF10: 0

## 2019-10-29 ENCOUNTER — HOSPITAL ENCOUNTER (OUTPATIENT)
Dept: RADIATION ONCOLOGY | Age: 64
Discharge: HOME | End: 2019-10-29
Attending: RADIOLOGY
Payer: COMMERCIAL

## 2019-10-29 PROCEDURE — 77014 PR CT GUIDANCE PLACEMENT RAD THERAPY FIELDS: CPT | Performed by: RADIOLOGY

## 2019-10-29 PROCEDURE — G6015 RADIATION TX DELIVERY IMRT: HCPCS | Performed by: RADIOLOGY

## 2019-10-30 ENCOUNTER — HOSPITAL ENCOUNTER (OUTPATIENT)
Dept: RADIATION ONCOLOGY | Age: 64
Discharge: HOME | End: 2019-10-30
Attending: RADIOLOGY
Payer: COMMERCIAL

## 2019-10-30 PROCEDURE — 77336 RADIATION PHYSICS CONSULT: CPT | Performed by: RADIOLOGY

## 2019-10-30 PROCEDURE — 77014 PR CT GUIDANCE PLACEMENT RAD THERAPY FIELDS: CPT | Performed by: RADIOLOGY

## 2019-10-30 PROCEDURE — G6015 RADIATION TX DELIVERY IMRT: HCPCS | Performed by: RADIOLOGY

## 2019-10-31 ENCOUNTER — HOSPITAL ENCOUNTER (OUTPATIENT)
Dept: RADIATION ONCOLOGY | Age: 64
Discharge: HOME | End: 2019-10-31
Attending: RADIOLOGY
Payer: COMMERCIAL

## 2019-10-31 PROCEDURE — 77014 PR CT GUIDANCE PLACEMENT RAD THERAPY FIELDS: CPT | Performed by: RADIOLOGY

## 2019-10-31 PROCEDURE — 99999 PR OFFICE/OUTPT VISIT,PROCEDURE ONLY: CPT | Performed by: RADIOLOGY

## 2019-10-31 PROCEDURE — G6015 RADIATION TX DELIVERY IMRT: HCPCS | Performed by: RADIOLOGY

## 2019-11-01 ENCOUNTER — HOSPITAL ENCOUNTER (OUTPATIENT)
Dept: RADIATION ONCOLOGY | Age: 64
Discharge: HOME | End: 2019-11-01
Attending: RADIOLOGY
Payer: COMMERCIAL

## 2019-11-01 PROCEDURE — 77014 PR CT GUIDANCE PLACEMENT RAD THERAPY FIELDS: CPT | Performed by: RADIOLOGY

## 2019-11-01 PROCEDURE — 99999 PR OFFICE/OUTPT VISIT,PROCEDURE ONLY: CPT | Performed by: RADIOLOGY

## 2019-11-01 PROCEDURE — G6015 RADIATION TX DELIVERY IMRT: HCPCS | Performed by: RADIOLOGY

## 2019-11-04 ENCOUNTER — HOSPITAL ENCOUNTER (OUTPATIENT)
Dept: RADIATION ONCOLOGY | Age: 64
Discharge: HOME | End: 2019-11-04
Attending: RADIOLOGY
Payer: COMMERCIAL

## 2019-11-04 ENCOUNTER — HOSPITAL ENCOUNTER (OUTPATIENT)
Dept: RADIATION ONCOLOGY | Age: 64
Discharge: HOME | End: 2019-11-04
Payer: COMMERCIAL

## 2019-11-04 VITALS — BODY MASS INDEX: 36.06 KG/M2 | WEIGHT: 223.4 LBS

## 2019-11-04 PROCEDURE — G6015 RADIATION TX DELIVERY IMRT: HCPCS | Performed by: RADIOLOGY

## 2019-11-04 PROCEDURE — 77014 PR CT GUIDANCE PLACEMENT RAD THERAPY FIELDS: CPT | Performed by: RADIOLOGY

## 2019-11-04 ASSESSMENT — PAIN SCALES - GENERAL: PAINLEVEL_OUTOF10: 0

## 2019-11-05 ENCOUNTER — HOSPITAL ENCOUNTER (OUTPATIENT)
Dept: RADIATION ONCOLOGY | Age: 64
Discharge: HOME | End: 2019-11-05
Attending: RADIOLOGY
Payer: COMMERCIAL

## 2019-11-05 PROCEDURE — G6015 RADIATION TX DELIVERY IMRT: HCPCS | Performed by: RADIOLOGY

## 2019-11-05 PROCEDURE — 77014 PR CT GUIDANCE PLACEMENT RAD THERAPY FIELDS: CPT | Performed by: RADIOLOGY

## 2019-11-06 ENCOUNTER — HOSPITAL ENCOUNTER (OUTPATIENT)
Dept: RADIATION ONCOLOGY | Age: 64
Discharge: HOME | End: 2019-11-06
Attending: RADIOLOGY
Payer: COMMERCIAL

## 2019-11-06 PROCEDURE — 77014 PR CT GUIDANCE PLACEMENT RAD THERAPY FIELDS: CPT | Performed by: RADIOLOGY

## 2019-11-06 PROCEDURE — G6015 RADIATION TX DELIVERY IMRT: HCPCS | Performed by: RADIOLOGY

## 2019-11-06 PROCEDURE — 77336 RADIATION PHYSICS CONSULT: CPT | Performed by: RADIOLOGY

## 2019-11-07 ENCOUNTER — HOSPITAL ENCOUNTER (OUTPATIENT)
Dept: RADIATION ONCOLOGY | Age: 64
Discharge: HOME | End: 2019-11-07
Attending: RADIOLOGY
Payer: COMMERCIAL

## 2019-11-07 PROCEDURE — 77014 PR CT GUIDANCE PLACEMENT RAD THERAPY FIELDS: CPT | Performed by: RADIOLOGY

## 2019-11-07 PROCEDURE — G6015 RADIATION TX DELIVERY IMRT: HCPCS | Performed by: RADIOLOGY

## 2019-11-08 ENCOUNTER — HOSPITAL ENCOUNTER (OUTPATIENT)
Dept: RADIATION ONCOLOGY | Age: 64
Discharge: HOME | End: 2019-11-08
Attending: RADIOLOGY
Payer: COMMERCIAL

## 2019-11-08 PROCEDURE — 77014 PR CT GUIDANCE PLACEMENT RAD THERAPY FIELDS: CPT | Performed by: RADIOLOGY

## 2019-11-08 PROCEDURE — G6015 RADIATION TX DELIVERY IMRT: HCPCS | Performed by: RADIOLOGY

## 2019-11-11 ENCOUNTER — HOSPITAL ENCOUNTER (OUTPATIENT)
Dept: RADIATION ONCOLOGY | Age: 64
Discharge: HOME | End: 2019-11-11
Payer: COMMERCIAL

## 2019-11-11 ENCOUNTER — HOSPITAL ENCOUNTER (OUTPATIENT)
Dept: RADIATION ONCOLOGY | Age: 64
Discharge: HOME | End: 2019-11-11
Attending: RADIOLOGY
Payer: COMMERCIAL

## 2019-11-11 VITALS — BODY MASS INDEX: 36.7 KG/M2 | WEIGHT: 227.38 LBS

## 2019-11-11 PROCEDURE — G6015 RADIATION TX DELIVERY IMRT: HCPCS | Performed by: RADIOLOGY

## 2019-11-11 PROCEDURE — 77014 PR CT GUIDANCE PLACEMENT RAD THERAPY FIELDS: CPT | Performed by: RADIOLOGY

## 2019-11-11 ASSESSMENT — PAIN SCALES - GENERAL: PAINLEVEL_OUTOF10: 0

## 2019-11-12 ENCOUNTER — HOSPITAL ENCOUNTER (OUTPATIENT)
Dept: RADIATION ONCOLOGY | Age: 64
Discharge: HOME | End: 2019-11-12
Attending: RADIOLOGY
Payer: COMMERCIAL

## 2019-11-12 PROCEDURE — 77014 PR CT GUIDANCE PLACEMENT RAD THERAPY FIELDS: CPT | Performed by: RADIOLOGY

## 2019-11-12 PROCEDURE — G6015 RADIATION TX DELIVERY IMRT: HCPCS | Performed by: RADIOLOGY

## 2019-11-13 ENCOUNTER — HOSPITAL ENCOUNTER (OUTPATIENT)
Dept: RADIATION ONCOLOGY | Age: 64
Discharge: HOME | End: 2019-11-13
Attending: RADIOLOGY
Payer: COMMERCIAL

## 2019-11-13 PROCEDURE — 77014 PR CT GUIDANCE PLACEMENT RAD THERAPY FIELDS: CPT | Performed by: RADIOLOGY

## 2019-11-13 PROCEDURE — 77336 RADIATION PHYSICS CONSULT: CPT | Performed by: RADIOLOGY

## 2019-11-13 PROCEDURE — G6015 RADIATION TX DELIVERY IMRT: HCPCS | Performed by: RADIOLOGY

## 2019-11-14 ENCOUNTER — HOSPITAL ENCOUNTER (OUTPATIENT)
Dept: RADIATION ONCOLOGY | Age: 64
Discharge: HOME | End: 2019-11-14
Attending: RADIOLOGY
Payer: COMMERCIAL

## 2019-11-14 PROCEDURE — G6015 RADIATION TX DELIVERY IMRT: HCPCS | Performed by: RADIOLOGY

## 2019-11-14 PROCEDURE — 77014 PR CT GUIDANCE PLACEMENT RAD THERAPY FIELDS: CPT | Performed by: RADIOLOGY

## 2019-11-15 ENCOUNTER — HOSPITAL ENCOUNTER (OUTPATIENT)
Dept: RADIATION ONCOLOGY | Age: 64
Discharge: HOME | End: 2019-11-15
Attending: RADIOLOGY
Payer: COMMERCIAL

## 2019-11-15 PROCEDURE — 77014 PR CT GUIDANCE PLACEMENT RAD THERAPY FIELDS: CPT | Performed by: RADIOLOGY

## 2019-11-15 PROCEDURE — G6015 RADIATION TX DELIVERY IMRT: HCPCS | Performed by: RADIOLOGY

## 2019-11-18 ENCOUNTER — HOSPITAL ENCOUNTER (OUTPATIENT)
Dept: RADIATION ONCOLOGY | Age: 64
Discharge: HOME | End: 2019-11-18
Payer: COMMERCIAL

## 2019-11-18 ENCOUNTER — HOSPITAL ENCOUNTER (OUTPATIENT)
Dept: RADIATION ONCOLOGY | Age: 64
Discharge: HOME | End: 2019-11-18
Attending: RADIOLOGY
Payer: COMMERCIAL

## 2019-11-18 VITALS — BODY MASS INDEX: 36.77 KG/M2 | HEIGHT: 66 IN | WEIGHT: 228.8 LBS

## 2019-11-18 PROCEDURE — 77014 PR CT GUIDANCE PLACEMENT RAD THERAPY FIELDS: CPT | Performed by: RADIOLOGY

## 2019-11-18 PROCEDURE — G6015 RADIATION TX DELIVERY IMRT: HCPCS | Performed by: RADIOLOGY

## 2019-11-18 ASSESSMENT — PAIN SCALES - GENERAL: PAINLEVEL_OUTOF10: 0

## 2019-11-19 PROCEDURE — 77334 RADIATION TREATMENT AID(S): CPT | Performed by: RADIOLOGY

## 2019-11-19 PROCEDURE — 77307 TELETHX ISODOSE PLAN CPLX: CPT | Performed by: RADIOLOGY

## 2019-11-20 ENCOUNTER — HOSPITAL ENCOUNTER (OUTPATIENT)
Dept: RADIATION ONCOLOGY | Age: 64
Discharge: HOME | End: 2019-11-20
Attending: RADIOLOGY
Payer: COMMERCIAL

## 2019-11-20 PROCEDURE — 77014 PR CT GUIDANCE PLACEMENT RAD THERAPY FIELDS: CPT | Performed by: RADIOLOGY

## 2019-11-20 PROCEDURE — G6015 RADIATION TX DELIVERY IMRT: HCPCS | Performed by: RADIOLOGY

## 2019-11-21 ENCOUNTER — HOSPITAL ENCOUNTER (OUTPATIENT)
Dept: RADIATION ONCOLOGY | Age: 64
Discharge: HOME | End: 2019-11-21
Attending: RADIOLOGY
Payer: COMMERCIAL

## 2019-11-21 PROCEDURE — G6015 RADIATION TX DELIVERY IMRT: HCPCS | Performed by: RADIOLOGY

## 2019-11-21 PROCEDURE — 77336 RADIATION PHYSICS CONSULT: CPT | Performed by: RADIOLOGY

## 2019-11-21 PROCEDURE — 77014 PR CT GUIDANCE PLACEMENT RAD THERAPY FIELDS: CPT | Performed by: RADIOLOGY

## 2019-11-22 ENCOUNTER — HOSPITAL ENCOUNTER (OUTPATIENT)
Dept: RADIATION ONCOLOGY | Age: 64
Discharge: HOME | End: 2019-11-22
Attending: RADIOLOGY
Payer: COMMERCIAL

## 2019-11-22 PROCEDURE — 77334 RADIATION TREATMENT AID(S): CPT | Performed by: RADIOLOGY

## 2019-11-22 PROCEDURE — G6013 RADIATION TREATMENT DELIVERY: HCPCS | Performed by: RADIOLOGY

## 2019-11-25 ENCOUNTER — HOSPITAL ENCOUNTER (OUTPATIENT)
Dept: RADIATION ONCOLOGY | Age: 64
Discharge: HOME | End: 2019-11-25
Attending: RADIOLOGY
Payer: COMMERCIAL

## 2019-11-25 PROCEDURE — G6013 RADIATION TREATMENT DELIVERY: HCPCS | Performed by: RADIOLOGY

## 2019-11-26 ENCOUNTER — HOSPITAL ENCOUNTER (OUTPATIENT)
Dept: RADIATION ONCOLOGY | Age: 64
Discharge: HOME | End: 2019-11-26
Attending: RADIOLOGY
Payer: COMMERCIAL

## 2019-11-26 PROCEDURE — G6013 RADIATION TREATMENT DELIVERY: HCPCS | Performed by: RADIOLOGY

## 2019-11-27 ENCOUNTER — HOSPITAL ENCOUNTER (OUTPATIENT)
Dept: RADIATION ONCOLOGY | Age: 64
Discharge: HOME | End: 2019-11-27
Attending: RADIOLOGY
Payer: COMMERCIAL

## 2019-11-27 PROCEDURE — 77417 THER RADIOLOGY PORT IMAGE(S): CPT | Performed by: RADIOLOGY

## 2019-11-27 PROCEDURE — G6013 RADIATION TREATMENT DELIVERY: HCPCS | Performed by: RADIOLOGY

## 2019-12-02 ENCOUNTER — HOSPITAL ENCOUNTER (OUTPATIENT)
Dept: RADIATION ONCOLOGY | Age: 64
Discharge: HOME | End: 2019-12-02
Payer: COMMERCIAL

## 2019-12-02 ENCOUNTER — HOSPITAL ENCOUNTER (OUTPATIENT)
Dept: RADIATION ONCOLOGY | Age: 64
Discharge: HOME | End: 2019-12-02
Attending: RADIOLOGY
Payer: COMMERCIAL

## 2019-12-02 VITALS — WEIGHT: 228.2 LBS | BODY MASS INDEX: 36.83 KG/M2

## 2019-12-02 PROCEDURE — 77336 RADIATION PHYSICS CONSULT: CPT | Performed by: RADIOLOGY

## 2019-12-02 PROCEDURE — G6013 RADIATION TREATMENT DELIVERY: HCPCS | Performed by: RADIOLOGY

## 2019-12-02 ASSESSMENT — PAIN SCALES - GENERAL: PAINLEVEL_OUTOF10: 0

## 2019-12-03 ENCOUNTER — HOSPITAL ENCOUNTER (OUTPATIENT)
Dept: RADIATION ONCOLOGY | Age: 64
Discharge: HOME | End: 2019-12-03
Attending: RADIOLOGY
Payer: COMMERCIAL

## 2019-12-03 PROCEDURE — G6013 RADIATION TREATMENT DELIVERY: HCPCS | Performed by: RADIOLOGY

## 2019-12-04 ENCOUNTER — HOSPITAL ENCOUNTER (OUTPATIENT)
Dept: RADIATION ONCOLOGY | Age: 64
Discharge: HOME | End: 2019-12-04
Attending: RADIOLOGY
Payer: COMMERCIAL

## 2019-12-04 PROCEDURE — G6013 RADIATION TREATMENT DELIVERY: HCPCS | Performed by: RADIOLOGY

## 2019-12-05 ENCOUNTER — HOSPITAL ENCOUNTER (OUTPATIENT)
Dept: RADIATION ONCOLOGY | Age: 64
Discharge: HOME | End: 2019-12-05
Attending: RADIOLOGY
Payer: COMMERCIAL

## 2019-12-05 PROCEDURE — 77336 RADIATION PHYSICS CONSULT: CPT | Performed by: RADIOLOGY

## 2019-12-05 PROCEDURE — G6013 RADIATION TREATMENT DELIVERY: HCPCS | Performed by: RADIOLOGY

## 2019-12-05 ASSESSMENT — PAIN SCALES - GENERAL: PAINLEVEL_OUTOF10: 0

## 2020-01-08 ENCOUNTER — HOSPITAL ENCOUNTER (OUTPATIENT)
Dept: RADIATION ONCOLOGY | Age: 65
Discharge: HOME | End: 2020-01-08
Attending: RADIOLOGY
Payer: COMMERCIAL

## 2020-01-08 VITALS
SYSTOLIC BLOOD PRESSURE: 150 MMHG | RESPIRATION RATE: 16 BRPM | OXYGEN SATURATION: 97 % | WEIGHT: 231 LBS | BODY MASS INDEX: 37.12 KG/M2 | DIASTOLIC BLOOD PRESSURE: 80 MMHG | HEIGHT: 66 IN | TEMPERATURE: 98.7 F | HEART RATE: 72 BPM

## 2020-01-08 PROCEDURE — 99211 OFF/OP EST MAY X REQ PHY/QHP: CPT | Performed by: RADIOLOGY

## 2020-01-08 ASSESSMENT — PAIN SCALES - GENERAL: PAINLEVEL_OUTOF10: 0

## 2020-01-08 NOTE — PROGRESS NOTES
Brandy Beltre [facility]   1/8/2020    Patient is seen today for follow up. Vitals:    01/08/20 1313   BP: (!) 150/80   Pulse: 72   Resp: 16   Temp: 98.7 °F (37.1 °C)   SpO2: 97%        Wt Readings from Last 3 Encounters:   01/08/20 231 lb (104.8 kg)   12/02/19 228 lb 3.2 oz (103.5 kg)   11/18/19 228 lb 12.8 oz (103.8 kg)       Pain Assessment  Pain Assessment: 0-10  Pain Level: 0  Patient's Stated Pain Goal: No pain  Multiple Pain Sites: No  Denies Need for Intervention     No Known Allergies     No current outpatient medications on file. No current facility-administered medications for this encounter. Additional Comments: Pt denies complaints pain. Reports skin healing well. Energy level is still low at this time.     Electronically signed by Cody Marmolejo RN on 1/8/2020 at 1:14 PM

## 2020-01-08 NOTE — PROGRESS NOTES
[address] [address], [address] [facility]  Phone: [phone]  Fax: [phone]    RADIATION ONCOLOGY FOLLOW UP REPORT    PATIENT NAME:  Charlotte Navarro              : 1955  MEDICAL RECORD NO: [id number]    [facility] NO: [id number]        PROVIDER: Aissatou Shannon MD      DATE OF SERVICE: 2020     FOLLOW UP PHYSICIANS:   Primary Care: Ledy Carrillo MD   Medical Oncologist: None  Surgeon: Dr. Leopold Fears      DIAGNOSIS: Cancer Staging  Ductal carcinoma in situ of left breast  Staging form: Breast, AJCC 8th Edition  - Clinical: Stage 0 (cTis (DCIS), cN0, cM0, G2, ER+, [place]+, HER2-) - Unsigned    Malignant neoplasm of lower-outer quadrant of right female breast ([address]  Staging form: Breast, AJCC 8th Edition  - Pathologic: Stage IB (pT3, pN0, cM0, G2, ER+, [place]+, HER2-) - Unsigned         TREATMENT COURSE:      Ductal carcinoma in situ of left breast    2019 Initial Diagnosis     Ductal carcinoma in situ of left breast      2019 Surgery     Lumpectomy  40mm DCIS  Close <1mm margin      2019 Surgery     Re-excision  (-) margins      10/17/2019 - 2019 Radiation     SIB-IMRT to Bilateral Breasts:  Dual Arc VMAT using 6MV photons  Left Breast: 200 cGy x 25 = 5000 cGy        Malignant neoplasm of lower-outer quadrant of right female breast ([address]    2019 Initial Diagnosis     Malignant neoplasm of lower-outer quadrant of right female breast ([address]      2019 Surgery     Right Lumpectomy + Rt ALND  63mm Invasive Lobular Ca, 0/10 LNs  (+) margins, (-) LVSI      2019 Surgery     Re-excision   (-) margins      10/17/2019 - 2019 Radiation     SIB-IMRT to Bilateral Breasts:  Dual Arc VMAT using 6MV photons  Right Breast: 180 cGy x 25 = 4500 cGy    Right Lumpectomy Boost:   AP/RPO wedged pair using 6/18 MV photons  200 cGy x 8 = 1600 cGy  Total Dose: 6100 cGy             CURRENT THERAPY:  Surveillance      INTERVAL HISTORY: Healed well after radiation, no residual skin changes  Continues to have fatigue and wants to go back to work part-time      MEDICATIONS:   No medications      REVIEW OF SYSTEMS:  Pertinent positive and negatives in History: the remainder of the 10-pt ROS is negative. EXAMINATION:   Vitals:    01/08/20 1313   BP: (!) 150/80   Pulse: 72   Resp: 16   Temp: 98.7 °F (37.1 °C)   SpO2: 97%           The patient is in no acute distress. Bilateral breast with residual faint tanning and no desquamation, no edema of the NAC      MEDICAL DECISION MAKING:   Healed well from RT  To f/u with Dr. Stern Pert   RTC 6 months to schedule MG if not ordered previously    Pt refusing to consider HT and does not want to meet with medical oncologist.  She states that she does not want to take any medication that can \"make her bones more brittle. \" Advised that she would likely take Ca/Vit D supplements and be screened with bone density tests. She continues to refuse medical oncology appointment or HT.         Electronically signed by Electronically signed by Skye Giordano MD on 1/8/2020 at 1:42 PM

## 2020-07-20 ENCOUNTER — OFFICE VISIT (OUTPATIENT)
Dept: PRIMARY CARE CLINIC | Age: 65
End: 2020-07-20
Payer: COMMERCIAL

## 2020-07-20 ENCOUNTER — HOSPITAL ENCOUNTER (OUTPATIENT)
Age: 65
Setting detail: SPECIMEN
Discharge: HOME | End: 2020-07-20
Payer: COMMERCIAL

## 2020-07-20 VITALS — OXYGEN SATURATION: 96 % | HEART RATE: 87 BPM | TEMPERATURE: 97.3 F

## 2020-07-20 PROCEDURE — 99213 OFFICE O/P EST LOW 20 MIN: CPT | Performed by: FAMILY MEDICINE

## 2020-07-20 PROCEDURE — U0002 COVID-19 LAB TEST NON-CDC: HCPCS

## 2020-07-20 NOTE — PROGRESS NOTES
7/20/2020    HPI:  Chief complaint and history of present illness as per medical assistant/nurse documented today in the Flu/COVID-19 clinic. MEDICATIONS:  Prior to Visit Medications    Not on File       Past Medical History:   Diagnosis Date    Breast cancer ([facility] [address] 06/24/2019    DCIS on the left and infiltrating lobular on the right    Bronchitis 02/2019    DVT (deep vein thrombosis) in pregnancy 2016    right leg    History of external beam radiation therapy 2019    Right Breast 6,100cGy and Left Breast 5,000cGy [facility] per     Pulmonary embolism ([facility] [address] 2016    saddle   ,   Past Surgical History:   Procedure Laterality Date    BREAST LUMPECTOMY Bilateral 7/24/2019    BREAST LUMPECTOMY PARTIAL MASTECTOMY RIGHT SENITINEL NODE BX LEFT LUMPECTOMY NEEDLE LOC BILATERAL BI ZORB performed by Jane Guillen MD at [address] [place] [address] Nw Bilateral 8/5/2019    BILATERAL BREAST LESION RE- EXCISION OF LUMPECTOMY performed by Jane Guillen MD at [facility]  2009    \"dilitation for difficulty swallowing food\"  done in [address] [facility]      fractured arm age 15       PHYSICAL EXAM:  Physical Exam  Vitals signs and nursing note reviewed. Constitutional:       General: She is not in acute distress. Appearance: Normal appearance. She is ill-appearing. HENT:      Head: Normocephalic and atraumatic. Right Ear: Tympanic membrane, ear canal and external ear normal.      Left Ear: Tympanic membrane, ear canal and external ear normal.      Nose: Congestion present. No rhinorrhea. Mouth/Throat:      Mouth: Mucous membranes are moist.      Pharynx: No oropharyngeal exudate or posterior oropharyngeal erythema. Comments: White tongue. Does not come off with scraping. Neck:      Musculoskeletal: Neck supple. No muscular tenderness. Cardiovascular:      Rate and Rhythm: Normal rate and regular rhythm. Heart sounds:  No murmur. Pulmonary:      Effort: Pulmonary effort is normal.      Breath sounds: Wheezing present. No rhonchi or rales. Lymphadenopathy:      Cervical: No cervical adenopathy. Neurological:      General: No focal deficit present. Mental Status: She is alert and oriented to person, place, and time. Psychiatric:         Mood and Affect: Mood normal.         Behavior: Behavior normal.         ASSESSMENT/PLAN:  1. Flu-like symptoms  COVID test done. Fluids, rest, OTC meds for symptom relief. Probable viral illness. Call PCP with worsening symptoms.  - COVID-19 Ambulatory      FOLLOW-UP:  Return if symptoms worsen or fail to improve.     In addition to other information, the printed after visit summary provided to the patient includes:  [x] COVID-19 Self care instructions  [x] COVID-19 General patient information

## 2020-07-24 LAB
SARS-COV-2: NOT DETECTED
SOURCE: NORMAL

## 2023-03-09 ENCOUNTER — TELEPHONE (OUTPATIENT)
Dept: FAMILY MEDICINE CLINIC | Age: 68
End: 2023-03-09

## 2023-03-09 NOTE — TELEPHONE ENCOUNTER
----- Message from Nima Barrett sent at 3/9/2023 11:07 AM EST -----  Subject: Referral Request    Reason for referral request? patient is requesting routine blood work and   wants to be tested for shingles and chicken pox  Provider patient wants to be referred to(if known): Ami Segal    Provider Phone Number(if known): Additional Information for Provider? patient wants to know if she has had   chicken pox and states she hasn't had blood work since the beginning of   Covid.  Patient is wanting these labs done asap.  ---------------------------------------------------------------------------  --------------  [address] [employer]    [phone]; OK to leave message on voicemail  ---------------------------------------------------------------------------  --------------

## 2023-03-21 ENCOUNTER — OFFICE VISIT (OUTPATIENT)
Dept: FAMILY MEDICINE CLINIC | Age: 68
End: 2023-03-21

## 2023-03-21 VITALS
HEART RATE: 70 BPM | DIASTOLIC BLOOD PRESSURE: 72 MMHG | BODY MASS INDEX: 36.32 KG/M2 | HEIGHT: 66 IN | SYSTOLIC BLOOD PRESSURE: 122 MMHG | OXYGEN SATURATION: 96 % | WEIGHT: 226 LBS

## 2023-03-21 DIAGNOSIS — Z87.891 FORMER SMOKER: ICD-10-CM

## 2023-03-21 DIAGNOSIS — Z20.820 EXPOSURE TO CHICKENPOX: ICD-10-CM

## 2023-03-21 DIAGNOSIS — Z00.01 ENCOUNTER FOR WELL ADULT EXAM WITH ABNORMAL FINDINGS: ICD-10-CM

## 2023-03-21 DIAGNOSIS — Z11.9 SCREENING EXAMINATION FOR INFECTIOUS DISEASE: ICD-10-CM

## 2023-03-21 DIAGNOSIS — Z92.3 PERSONAL HISTORY OF RADIATION THERAPY: ICD-10-CM

## 2023-03-21 DIAGNOSIS — R23.2 HOT FLASHES: ICD-10-CM

## 2023-03-21 DIAGNOSIS — Z98.890 HISTORY OF LUMPECTOMY OF BOTH BREASTS: ICD-10-CM

## 2023-03-21 DIAGNOSIS — Z00.00 WELCOME TO MEDICARE PREVENTIVE VISIT: Primary | ICD-10-CM

## 2023-03-21 DIAGNOSIS — R13.10 DYSPHAGIA, UNSPECIFIED TYPE: ICD-10-CM

## 2023-03-21 DIAGNOSIS — Z98.890 HISTORY OF ESOPHAGEAL DILATATION: ICD-10-CM

## 2023-03-21 DIAGNOSIS — R73.9 HYPERGLYCEMIA: ICD-10-CM

## 2023-03-21 DIAGNOSIS — Z85.3 HISTORY OF BREAST CANCER IN FEMALE: ICD-10-CM

## 2023-03-21 PROBLEM — D05.12 DUCTAL CARCINOMA IN SITU OF LEFT BREAST: Status: RESOLVED | Noted: 2019-09-05 | Resolved: 2023-03-21

## 2023-03-21 PROBLEM — I82.4Y1 ACUTE DEEP VEIN THROMBOSIS (DVT) OF PROXIMAL VEIN OF RIGHT LOWER EXTREMITY (HCC): Status: RESOLVED | Noted: 2017-01-31 | Resolved: 2023-03-21

## 2023-03-21 PROBLEM — C50.511 MALIGNANT NEOPLASM OF LOWER-OUTER QUADRANT OF RIGHT FEMALE BREAST (HCC): Status: RESOLVED | Noted: 2019-09-05 | Resolved: 2023-03-21

## 2023-03-21 LAB
ALBUMIN SERPL-MCNC: 4.2 G/DL (ref 3.4–5)
ALBUMIN/GLOB SERPL: 1.2 {RATIO} (ref 1.1–2.2)
ALP SERPL-CCNC: 84 U/L (ref 40–129)
ALT SERPL-CCNC: 12 U/L (ref 10–40)
ANION GAP SERPL CALCULATED.3IONS-SCNC: 17 MMOL/L (ref 3–16)
AST SERPL-CCNC: 17 U/L (ref 15–37)
BILIRUB SERPL-MCNC: 0.5 MG/DL (ref 0–1)
BUN SERPL-MCNC: 16 MG/DL (ref 7–20)
CALCIUM SERPL-MCNC: 9.4 MG/DL (ref 8.3–10.6)
CHLORIDE SERPL-SCNC: 105 MMOL/L (ref 99–110)
CHOLEST SERPL-MCNC: 221 MG/DL (ref 0–199)
CO2 SERPL-SCNC: 19 MMOL/L (ref 21–32)
CREAT SERPL-MCNC: 1 MG/DL (ref 0.6–1.2)
DEPRECATED RDW RBC AUTO: 13.6 % (ref 12.4–15.4)
GFR SERPLBLD CREATININE-BSD FMLA CKD-EPI: >60 ML/MIN/{1.73_M2}
GLUCOSE P FAST SERPL-MCNC: 108 MG/DL (ref 70–99)
HCT VFR BLD AUTO: 43.3 % (ref 36–48)
HCV AB SERPL QL IA: NORMAL
HDLC SERPL-MCNC: 55 MG/DL (ref 40–60)
HGB BLD-MCNC: 14.5 G/DL (ref 12–16)
LDL CHOLESTEROL CALCULATED: 131 MG/DL
MCH RBC QN AUTO: 31.2 PG (ref 26–34)
MCHC RBC AUTO-ENTMCNC: 33.6 G/DL (ref 31–36)
MCV RBC AUTO: 92.8 FL (ref 80–100)
PLATELET # BLD AUTO: 265 K/UL (ref 135–450)
PMV BLD AUTO: 10.3 FL (ref 5–10.5)
POTASSIUM SERPL-SCNC: 4.5 MMOL/L (ref 3.5–5.1)
PROT SERPL-MCNC: 7.6 G/DL (ref 6.4–8.2)
RBC # BLD AUTO: 4.66 M/UL (ref 4–5.2)
SODIUM SERPL-SCNC: 141 MMOL/L (ref 136–145)
TRIGL SERPL-MCNC: 175 MG/DL (ref 0–150)
VLDLC SERPL CALC-MCNC: 35 MG/DL
WBC # BLD AUTO: 7.9 K/UL (ref 4–11)

## 2023-03-21 SDOH — ECONOMIC STABILITY: FOOD INSECURITY: WITHIN THE PAST 12 MONTHS, THE FOOD YOU BOUGHT JUST DIDN'T LAST AND YOU DIDN'T HAVE MONEY TO GET MORE.: NEVER TRUE

## 2023-03-21 SDOH — ECONOMIC STABILITY: FOOD INSECURITY: WITHIN THE PAST 12 MONTHS, YOU WORRIED THAT YOUR FOOD WOULD RUN OUT BEFORE YOU GOT MONEY TO BUY MORE.: NEVER TRUE

## 2023-03-21 SDOH — ECONOMIC STABILITY: HOUSING INSECURITY
IN THE LAST 12 MONTHS, WAS THERE A TIME WHEN YOU DID NOT HAVE A STEADY PLACE TO SLEEP OR SLEPT IN A SHELTER (INCLUDING NOW)?: NO

## 2023-03-21 SDOH — ECONOMIC STABILITY: INCOME INSECURITY: HOW HARD IS IT FOR YOU TO PAY FOR THE VERY BASICS LIKE FOOD, HOUSING, MEDICAL CARE, AND HEATING?: NOT HARD AT ALL

## 2023-03-21 ASSESSMENT — PATIENT HEALTH QUESTIONNAIRE - PHQ9
2. FEELING DOWN, DEPRESSED OR HOPELESS: 0
SUM OF ALL RESPONSES TO PHQ9 QUESTIONS 1 & 2: 0
SUM OF ALL RESPONSES TO PHQ QUESTIONS 1-9: 0
1. LITTLE INTEREST OR PLEASURE IN DOING THINGS: 0
SUM OF ALL RESPONSES TO PHQ QUESTIONS 1-9: 0

## 2023-03-21 ASSESSMENT — ENCOUNTER SYMPTOMS
ABDOMINAL PAIN: 0
CHEST TIGHTNESS: 0
COUGH: 0
BACK PAIN: 0
WHEEZING: 0
SHORTNESS OF BREATH: 0

## 2023-03-21 ASSESSMENT — LIFESTYLE VARIABLES
HOW MANY STANDARD DRINKS CONTAINING ALCOHOL DO YOU HAVE ON A TYPICAL DAY: PATIENT DOES NOT DRINK
HOW OFTEN DO YOU HAVE A DRINK CONTAINING ALCOHOL: NEVER

## 2023-03-21 NOTE — PROGRESS NOTES
Chief Complaint   Patient presents with    Medicare AWV     Patient is overdue for AWV    New Patient     Patient is technically NEW to me, last seen 2019, saw Brayan Giron PCP in interim however    Coagulation Disorder     Patient had history of DVT and previously on [address]    Breast Cancer     Patient with history of bilateral breast cancer, s/p surgery (bilateral lumpectomy)  and radiation       [place] NARRATIVE:    A piece of hernandez only today. And a few sips of soda. So okay for blood work as ordered below. Multivitamin daily no other medications and no other f-u she says. Daily walking and healthy eating. Walks thirty minutes daily. She had bilateral breast cancer and underwent bilateral lumpectomy and postoperative radiation. She was seeing Dr. Carmela Platt who has since retired. She has not had follow-up breast imaging, she states she examines her breasts every week. She declines referral to breast surgeon for follow-up and she additionally declines follow-up mammogram.  She is aware we would want to monitor for recurrent lesions with mammography but still declines. She states we are \"cristhian she came today. \"    Annual wellness visit service was coupled with this problem based follow-up today. Her male partner has shingles and she asks if we can do blood test today. She knows if she is positive for varicella antibodies then she is at risk of getting shingles. She is not at all interested in shingles vaccine however. Patient is established unless otherwise noted. Above chief complaint and [place] obtained by this physician provider. Review of Systems   Constitutional:  Positive for diaphoresis (Reports she has frequent hot flashes at night. This since her breast surgery. She did not have hot flashes when she went through menopause). Negative for activity change, chills, fatigue and fever. HENT:  Negative for congestion.     Respiratory:  Negative for cough, chest tightness, shortness of breath and

## 2023-03-21 NOTE — PATIENT INSTRUCTIONS
inside the tub. To use this type you need to be able to safely step into the tub before you sit down. Nonskid mats    Water makes both the floor of the tub and the bathroom floor slippery. You can buy adhesive strips that stick to the floor of the tub. Or you can use a nonskid tub mat. Outside the tub, make sure you use a rug with a nonskid bottom. Don't use a plain towel. Hand-held shower faucet    With a hand-held faucet, you can get clean without having to lower yourself into the tub. Hang a shower curtain to keep water from spilling out onto the floor. Follow-up care is a key part of your treatment and safety. Be sure to make and go to all appointments, and call your doctor if you are having problems. It's also a good idea to know your test results and keep a list of the medicines you take. Current as of: November 9, 2022               Content Version: 13.6  © 2006-2023 [employer]. Care instructions adapted under license by [facility] ([facility]). If you have questions about a medical condition or this instruction, always ask your healthcare professional. [place] [address] any warranty or liability for your use of this information. Advance Directives: Care Instructions  Overview  An advance directive is a legal way to state your wishes at the end of your life. It tells your family and your doctor what to do if you can't say what you want. There are two main types of advance directives. You can change them any time your wishes change. Living will. This form tells your family and your doctor your wishes about life support and other treatment. The form is also called a declaration. Medical power of . This form lets you name a person to make treatment decisions for you when you can't speak for yourself. This person is called a health care agent (health care proxy, health care surrogate). The form is also called a durable power of  for health care.   If you do

## 2023-03-21 NOTE — PROGRESS NOTES
Medicare Annual Wellness Visit    Zia Cesar is here for Medicare AWV (Patient is overdue for AWV), New Patient (Patient is technically NEW to me, last seen 2019, saw [facility] PCP in interim however), Coagulation Disorder (Patient had history of DVT and previously on DOAC), and Breast Cancer (Patient with history of bilateral breast cancer, s/p surgery (bilateral lumpectomy)  and radiation)      SUBJECTIVE---------------------------------------------------------------------------------------------    Chief Complaint   Patient presents with    Medicare AWV     Patient is overdue for AWV    New Patient     Patient is technically NEW to me, last seen 2019, saw [facility] PCP in interim however    Coagulation Disorder     Patient had history of DVT and previously on [address]    Breast Cancer     Patient with history of bilateral breast cancer, s/p surgery (bilateral lumpectomy)  and radiation        Patient's complete Health Risk Assessment and screening values have been reviewed and are found in Flowsheets. The following problems were reviewed today and where indicated follow up appointments were made and/or referrals ordered. Findings noted upon review of Medicare Annual Wellness Visit [facility] [place] Report: [facility] report is reviewed. Falls risk screening is positive as she feels unsteady. Cognitive screen is normal, depression screening score is 0. She does not use alcohol or any substances. HRA questionnaire is abnormal for lack of shower bars grab bars nonslip mats or surfaces in her shower or bathtub. She reports having a living will but none is on file.   She does have a healthcare decision maker vented      Positive Risk Factor Screenings with Interventions:  Fall Risk:  Do you feel unsteady or are you worried about falling? : (!) yes  2 or more falls in past year?: no  Fall with injury in past year?: no   Interventions:    Handouts provided, physical therapy referral offered            General Health and

## 2023-03-22 LAB
EST. AVERAGE GLUCOSE BLD GHB EST-MCNC: 114 MG/DL
HBA1C MFR BLD: 5.6 %

## 2023-03-23 LAB
VZV IGG SER IA-ACNC: 1577 IV
VZV IGM SER IA-ACNC: 0.05 ISR

## 2024-11-11 LAB
CONTROL: NORMAL
FECAL BLOOD IMMUNOCHEMICAL TEST: NEGATIVE

## 2024-11-13 ENCOUNTER — OFFICE VISIT (OUTPATIENT)
Dept: FAMILY MEDICINE CLINIC | Age: 69
End: 2024-11-13

## 2024-11-13 VITALS
BODY MASS INDEX: 32.44 KG/M2 | SYSTOLIC BLOOD PRESSURE: 122 MMHG | HEART RATE: 81 BPM | DIASTOLIC BLOOD PRESSURE: 92 MMHG | WEIGHT: 201 LBS | OXYGEN SATURATION: 94 %

## 2024-11-13 DIAGNOSIS — Z00.00 MEDICARE ANNUAL WELLNESS VISIT, SUBSEQUENT: Primary | ICD-10-CM

## 2024-11-13 DIAGNOSIS — Z85.3 HISTORY OF BREAST CANCER IN FEMALE: ICD-10-CM

## 2024-11-13 DIAGNOSIS — Z98.890 HISTORY OF LUMPECTOMY OF BOTH BREASTS: ICD-10-CM

## 2024-11-13 DIAGNOSIS — L28.2 PRURITIC RASH: ICD-10-CM

## 2024-11-13 DIAGNOSIS — E78.2 MIXED HYPERLIPIDEMIA: ICD-10-CM

## 2024-11-13 DIAGNOSIS — Z86.711 HISTORY OF PULMONARY EMBOLISM: ICD-10-CM

## 2024-11-13 DIAGNOSIS — Z28.21 HERPES ZOSTER VACCINATION DECLINED: ICD-10-CM

## 2024-11-13 DIAGNOSIS — Z92.3 PERSONAL HISTORY OF RADIATION THERAPY: ICD-10-CM

## 2024-11-13 DIAGNOSIS — R73.9 HYPERGLYCEMIA: ICD-10-CM

## 2024-11-13 DIAGNOSIS — Z53.20 MAMMOGRAM DECLINED: ICD-10-CM

## 2024-11-13 DIAGNOSIS — Z53.20 COLON CANCER SCREENING DECLINED: ICD-10-CM

## 2024-11-13 DIAGNOSIS — Z87.891 FORMER SMOKER: ICD-10-CM

## 2024-11-13 ASSESSMENT — PATIENT HEALTH QUESTIONNAIRE - PHQ9
SUM OF ALL RESPONSES TO PHQ QUESTIONS 1-9: 0
SUM OF ALL RESPONSES TO PHQ9 QUESTIONS 1 & 2: 0
1. LITTLE INTEREST OR PLEASURE IN DOING THINGS: NOT AT ALL
2. FEELING DOWN, DEPRESSED OR HOPELESS: NOT AT ALL
SUM OF ALL RESPONSES TO PHQ QUESTIONS 1-9: 0

## 2024-11-13 ASSESSMENT — ENCOUNTER SYMPTOMS
ABDOMINAL PAIN: 0
WHEEZING: 0
SHORTNESS OF BREATH: 0
BACK PAIN: 0
CHEST TIGHTNESS: 0
COUGH: 0

## 2024-11-13 ASSESSMENT — LIFESTYLE VARIABLES
HOW OFTEN DO YOU HAVE A DRINK CONTAINING ALCOHOL: NEVER
HOW MANY STANDARD DRINKS CONTAINING ALCOHOL DO YOU HAVE ON A TYPICAL DAY: PATIENT DOES NOT DRINK

## 2024-11-13 NOTE — PATIENT INSTRUCTIONS
risk groups for death and serious injuries due to residential fires.   When cooking, wear short-sleeved items, never a bulky long-sleeved robe.    The [facility]'s Safety Checklist for Older Consumers emphasizes the importance of checking basements, garages, workshops and storage areas for fire hazards, such as volatile liquids, piles of old rags or clothing and overloaded circuits.    Never smoke in bed or when lying down on a couch or recliner chair.    Small portable electric or kerosene heaters are responsible for many home fires and should be used cautiously if at all. If you do use one, be sure to keep them away from flammable materials.    In case of fire, make sure you have a pre-established emergency exit plan.    Have a professional check your fireplace and other fuel-burning appliances yearly.    Helping Hands   Baby boomers entering the santiago years will continue to see the development of new products to help older adults live safely and independently in spite of age-related changes.  Making Life More Livable  , by Christy Deras, lists over 1,000 products for \"living well in the mature years,\" such as bathing and mobility aids, household security devices, ergonomically designed knives and peelers, and faucet valves and knobs for temperature control. Medical supply stores and organizations are good sources of information about products that improve your quality of life and insure your safety.     Last Reviewed: November 2009 Naldo Adams MD   Updated: 3/7/2011

## 2024-11-13 NOTE — PROGRESS NOTES
Medicare Annual Wellness Visit    Maliha Hanna is here for Medicare AWV (Primarily for ANNUAL WELLNESS VISIT service), Rash (Pt had fever for one day. Pt just recently received covid and flu vax and rash showed up after ), history of cancer (History of bilateral breast cancer, treated with lumpectomy in 2019, declined post-surgical f-u and oncology consult.  REFUSES F-U MAMMO today, also refuses colon ca screening and vaccines and lung ca screening ), and Hypertension (Blood pressure is borderline elevated today)         Subjective     Chief Complaint   Patient presents with    Medicare AWV     Primarily for ANNUAL WELLNESS VISIT service    Rash     Pt had fever for one day. Pt just recently received covid and flu vax and rash showed up after     history of cancer     History of bilateral breast cancer, treated with lumpectomy in 2019, declined post-surgical f-u and oncology consult.  REFUSES F-U MAMMO today, also refuses colon ca screening and vaccines and lung ca screening     Hypertension     Blood pressure is borderline elevated today        Patient's complete Health Risk Assessment and screening values have been reviewed and are found in Flowsheets. The following problems were reviewed today and where indicated follow up appointments were made and/or referrals ordered.    Findings noted upon review of Medicare Annual Wellness Visit [facility] [place] Report: [facility] report reviewed, no significant findings except she does have some tripping hazards and she does need assistance with transportation.  She reports having a living will and has medical decision maker documented, however no forms are on file, they will be requested.    Positive Risk Factor Screenings with Interventions:                Abnormal BMI (obese):  Body mass index is 32.44 kg/m². (!) Abnormal  Interventions:  Handouts provided           Safety:  Do you have any tripping hazards - loose or unsecured carpets or rugs?: (!)

## 2024-11-14 LAB
ALBUMIN SERPL-MCNC: 4.2 G/DL (ref 3.4–5)
ALBUMIN/GLOB SERPL: 1.3 {RATIO} (ref 1.1–2.2)
ALP SERPL-CCNC: 89 U/L (ref 40–129)
ALT SERPL-CCNC: 10 U/L (ref 10–40)
ANION GAP SERPL CALCULATED.3IONS-SCNC: 13 MMOL/L (ref 3–16)
AST SERPL-CCNC: 17 U/L (ref 15–37)
BASOPHILS # BLD: 0 K/UL (ref 0–0.2)
BASOPHILS NFR BLD: 0.5 %
BILIRUB SERPL-MCNC: 0.7 MG/DL (ref 0–1)
BUN SERPL-MCNC: 16 MG/DL (ref 7–20)
CALCIUM SERPL-MCNC: 9.4 MG/DL (ref 8.3–10.6)
CHLORIDE SERPL-SCNC: 101 MMOL/L (ref 99–110)
CHOLEST SERPL-MCNC: 205 MG/DL (ref 0–199)
CO2 SERPL-SCNC: 24 MMOL/L (ref 21–32)
CREAT SERPL-MCNC: 1.1 MG/DL (ref 0.6–1.2)
DEPRECATED RDW RBC AUTO: 13.5 % (ref 12.4–15.4)
EOSINOPHIL # BLD: 0.3 K/UL (ref 0–0.6)
EOSINOPHIL NFR BLD: 4.1 %
ERYTHROCYTE [SEDIMENTATION RATE] IN BLOOD BY WESTERGREN METHOD: 37 MM/HR (ref 0–30)
GFR SERPLBLD CREATININE-BSD FMLA CKD-EPI: 54 ML/MIN/{1.73_M2}
GLUCOSE P FAST SERPL-MCNC: 109 MG/DL (ref 70–99)
HCT VFR BLD AUTO: 46 % (ref 36–48)
HDLC SERPL-MCNC: 46 MG/DL (ref 40–60)
HGB BLD-MCNC: 15.2 G/DL (ref 12–16)
LDL CHOLESTEROL: 129 MG/DL
LYMPHOCYTES # BLD: 1.4 K/UL (ref 1–5.1)
LYMPHOCYTES NFR BLD: 19.4 %
MCH RBC QN AUTO: 30.7 PG (ref 26–34)
MCHC RBC AUTO-ENTMCNC: 33.1 G/DL (ref 31–36)
MCV RBC AUTO: 92.8 FL (ref 80–100)
MONOCYTES # BLD: 0.7 K/UL (ref 0–1.3)
MONOCYTES NFR BLD: 9.7 %
NEUTROPHILS # BLD: 4.8 K/UL (ref 1.7–7.7)
NEUTROPHILS NFR BLD: 66.3 %
PLATELET # BLD AUTO: 270 K/UL (ref 135–450)
PMV BLD AUTO: 9.9 FL (ref 5–10.5)
POTASSIUM SERPL-SCNC: 4.2 MMOL/L (ref 3.5–5.1)
PROT SERPL-MCNC: 7.5 G/DL (ref 6.4–8.2)
RBC # BLD AUTO: 4.96 M/UL (ref 4–5.2)
SODIUM SERPL-SCNC: 138 MMOL/L (ref 136–145)
TRIGL SERPL-MCNC: 152 MG/DL (ref 0–150)
VLDLC SERPL CALC-MCNC: 30 MG/DL
WBC # BLD AUTO: 7.2 K/UL (ref 4–11)

## 2025-03-13 ENCOUNTER — HOSPITAL ENCOUNTER (OUTPATIENT)
Dept: RADIATION ONCOLOGY | Age: 70
Discharge: HOME | End: 2025-03-13

## (undated) DEVICE — CHLORAPREP 26ML ORANGE

## (undated) DEVICE — GAUZE,SPONGE,4"X4",16PLY,XRAY,STRL,LF: Brand: MEDLINE

## (undated) DEVICE — SOLUTION IV IRRIG WATER 1000ML POUR BRL 2F7114

## (undated) DEVICE — ELECTRODE ES L2.75IN S STL INSUL BLDE W/ SL EDGE

## (undated) DEVICE — PACK,BASIC,IX: Brand: MEDLINE

## (undated) DEVICE — DRAPE,T,LAPARO,TRANS,STERILE: Brand: MEDLINE

## (undated) DEVICE — SYRINGE, LUER LOCK, 10ML: Brand: MEDLINE

## (undated) DEVICE — LINER,SEMI-RIGID,3000CC,50EA/CS: Brand: MEDLINE

## (undated) DEVICE — STERILE LATEX POWDER-FREE SURGICAL GLOVESWITH NITRILE COATING: Brand: PROTEXIS

## (undated) DEVICE — MARKER RAD MARGINMRK

## (undated) DEVICE — RADIOGRAPHY DEVICE SPECIMEN TRANSPEC

## (undated) DEVICE — ATTACHMENT SMK EVAC FOR ES PNCL ACCUVAC

## (undated) DEVICE — SKIN AFFIX SURG ADHESIVE 72/CS 0.55ML: Brand: MEDLINE

## (undated) DEVICE — DRAIN SURG W10XL20CM SIL SMOOTH FLAT 3/4 PERF DBL WRP

## (undated) DEVICE — ANESTHESIA CIRCUIT ADULT-LF: Brand: MEDLINE INDUSTRIES, INC.

## (undated) DEVICE — MARKER SURG MARGIN STD 6 CLR INK ASST CORR CLP

## (undated) DEVICE — SPONGE LAP W18XL18IN WHT COT 4 PLY FLD STRUNG RADPQ DISP ST

## (undated) DEVICE — YANKAUER,FLEXIBLE HANDLE,REGLR CAPACITY: Brand: MEDLINE INDUSTRIES, INC.

## (undated) DEVICE — COVER,MAYO STAND,STERILE: Brand: MEDLINE

## (undated) DEVICE — DRAPE SHEET ULTRAGARD: Brand: MEDLINE

## (undated) DEVICE — TUBING, SUCTION, 3/16" X 10', STRAIGHT: Brand: MEDLINE

## (undated) DEVICE — SUTURE VCRL SZ 2-0 L27IN ABSRB UD L26MM SH 1/2 CIR J417H

## (undated) DEVICE — COVER US PRB W12XL244CM SURGICAL INTRAOPERATIVE PLAS TAPR L

## (undated) DEVICE — MARKER,SKIN,WI/RULER AND LABELS: Brand: MEDLINE

## (undated) DEVICE — GOWN,ECLIPSE,POLYRNF,BRTHSLV,L,30/CS: Brand: MEDLINE

## (undated) DEVICE — SUTURE VCRL SZ 3-0 L27IN ABSRB UD L17MM RB-1 1/2 CIR J215H

## (undated) DEVICE — RETRACTOR SURG WIDE FLAT LO PROF LTWT PHOTONGUIDE

## (undated) DEVICE — LUER-LOK 360°: Brand: CONNECTA, LUER-LOK

## (undated) DEVICE — TOWEL,OR,DSP,ST,BLUE,STD,6/PK,12PK/CS: Brand: MEDLINE

## (undated) DEVICE — NEEDLE HYPO 23GA L1.5IN TURQ POLYPR HUB S STL THN WALL IM

## (undated) DEVICE — TUBING, SUCTION, 9/32" X 10', STRAIGHT: Brand: MEDLINE

## (undated) DEVICE — SUTURE PERMA HND 2-0 L18IN NONABSORBABLE BLK X-1 L22IN 1/2 737G

## (undated) DEVICE — SUTURE VCRL SZ 5-0 L18IN ABSRB UD L13MM P-3 3/8 CIR PRIM J493G

## (undated) DEVICE — COUNTER NDL 30 COUNT FOAM STRP SGL MAG

## (undated) DEVICE — SUTURE VCRL SZ 3-0 L27IN ABSRB UD L26MM SH 1/2 CIR J416H

## (undated) DEVICE — PENCIL ES CRD L10FT HND SWCHING ROCK SWCH W/ EDGE COAT BLDE

## (undated) DEVICE — CORD ES L15FT PT RET REUSE VALLEYLAB REM

## (undated) DEVICE — INTENDED FOR TISSUE SEPARATION, AND OTHER PROCEDURES THAT REQUIRE A SHARP SURGICAL BLADE TO PUNCTURE OR CUT.: Brand: BARD-PARKER ® STAINLESS STEEL BLADES

## (undated) DEVICE — CONTAINER,SPECIMEN,OR STERILE,4OZ: Brand: MEDLINE

## (undated) DEVICE — LINER SUCT CANSTR 1500CC SEMI RIG W/ POR HYDROPHOBIC SHUT

## (undated) DEVICE — GLOVE SURG SZ 65 THK91MIL LTX FREE SYN POLYISOPRENE

## (undated) DEVICE — SPONGE DRN W4XL4IN RAYON/POLYESTER 6 PLY NONWOVEN PRECUT

## (undated) DEVICE — MARKER SURG SKIN UTIL REGULAR/FINE 2 TIP W/ RUL AND 9 LBL

## (undated) DEVICE — ELECTRODE ES AD CRDLSS PT RET REM POLYHESIVE

## (undated) DEVICE — PRESSURE TUBING: Brand: TRUWAVE

## (undated) DEVICE — RESERVOIR,SUCTION,100CC,SILICONE: Brand: MEDLINE

## (undated) DEVICE — GLOVE SURG SZ 65 L12IN FNGR THK87MIL WHT LTX FREE

## (undated) DEVICE — RETRACTOR SURG NARROW FLAT ULT LTWT ELEV TIP LO PROF

## (undated) DEVICE — SUTURE PERMAHAND SZ 2-0 L17X18IN NONABSORBABLE BLK SILK SA65H

## (undated) DEVICE — INTEGRA® JARIT® DEBAKEY VASCULAR FORCEPS, 5-7/8", CARDIO-GRIP, TISSUE, STRAIGHT, 2MM WIDE: Brand: INTEGRA® JARIT®